# Patient Record
Sex: FEMALE | Race: WHITE | NOT HISPANIC OR LATINO | URBAN - METROPOLITAN AREA
[De-identification: names, ages, dates, MRNs, and addresses within clinical notes are randomized per-mention and may not be internally consistent; named-entity substitution may affect disease eponyms.]

---

## 2017-05-31 ENCOUNTER — OUTPATIENT (OUTPATIENT)
Dept: OUTPATIENT SERVICES | Facility: HOSPITAL | Age: 67
LOS: 1 days | End: 2017-05-31

## 2017-06-08 ENCOUNTER — OUTPATIENT (OUTPATIENT)
Dept: OUTPATIENT SERVICES | Facility: HOSPITAL | Age: 67
LOS: 1 days | End: 2017-06-08

## 2017-08-21 ENCOUNTER — OUTPATIENT (OUTPATIENT)
Dept: OUTPATIENT SERVICES | Facility: HOSPITAL | Age: 67
LOS: 1 days | End: 2017-08-21

## 2018-06-12 ENCOUNTER — OUTPATIENT (OUTPATIENT)
Dept: OUTPATIENT SERVICES | Facility: HOSPITAL | Age: 68
LOS: 1 days | End: 2018-06-12

## 2018-06-19 ENCOUNTER — OUTPATIENT (OUTPATIENT)
Dept: OUTPATIENT SERVICES | Facility: HOSPITAL | Age: 68
LOS: 1 days | End: 2018-06-19

## 2018-09-27 ENCOUNTER — OUTPATIENT (OUTPATIENT)
Dept: OUTPATIENT SERVICES | Facility: HOSPITAL | Age: 68
LOS: 1 days | End: 2018-09-27

## 2018-11-02 ENCOUNTER — OUTPATIENT (OUTPATIENT)
Dept: OUTPATIENT SERVICES | Facility: HOSPITAL | Age: 68
LOS: 1 days | End: 2018-11-02

## 2019-01-17 ENCOUNTER — OUTPATIENT (OUTPATIENT)
Dept: OUTPATIENT SERVICES | Facility: HOSPITAL | Age: 69
LOS: 1 days | End: 2019-01-17

## 2019-05-28 PROBLEM — Z00.00 ENCOUNTER FOR PREVENTIVE HEALTH EXAMINATION: Status: ACTIVE | Noted: 2019-05-28

## 2019-05-31 ENCOUNTER — NON-APPOINTMENT (OUTPATIENT)
Age: 69
End: 2019-05-31

## 2019-05-31 ENCOUNTER — APPOINTMENT (OUTPATIENT)
Dept: CARDIOLOGY | Facility: CLINIC | Age: 69
End: 2019-05-31
Payer: MEDICARE

## 2019-05-31 VITALS
HEIGHT: 71 IN | WEIGHT: 222 LBS | HEART RATE: 80 BPM | DIASTOLIC BLOOD PRESSURE: 60 MMHG | SYSTOLIC BLOOD PRESSURE: 122 MMHG | OXYGEN SATURATION: 96 % | BODY MASS INDEX: 31.08 KG/M2

## 2019-05-31 DIAGNOSIS — Z81.8 FAMILY HISTORY OF OTHER MENTAL AND BEHAVIORAL DISORDERS: ICD-10-CM

## 2019-05-31 PROCEDURE — 93000 ELECTROCARDIOGRAM COMPLETE: CPT

## 2019-05-31 PROCEDURE — 99204 OFFICE O/P NEW MOD 45 MIN: CPT | Mod: 25

## 2019-05-31 RX ORDER — MECLIZINE HYDROCHLORIDE 25 MG/1
25 TABLET ORAL 3 TIMES DAILY
Refills: 0 | Status: ACTIVE | COMMUNITY

## 2019-05-31 RX ORDER — ASCORBIC ACID
CRYSTALS ORAL
Refills: 0 | Status: ACTIVE | COMMUNITY

## 2019-05-31 RX ORDER — IBUPROFEN 200 MG
600 CAPSULE ORAL
Refills: 0 | Status: ACTIVE | COMMUNITY

## 2019-05-31 RX ORDER — OMEPRAZOLE MAGNESIUM 20 MG/1
20 CAPSULE, DELAYED RELEASE ORAL DAILY
Refills: 0 | Status: ACTIVE | COMMUNITY

## 2019-05-31 RX ORDER — DENOSUMAB 60 MG/ML
60 INJECTION SUBCUTANEOUS
Refills: 0 | Status: ACTIVE | COMMUNITY

## 2019-05-31 RX ORDER — TRAVOPROST 0.04 MG/ML
0 SOLUTION/ DROPS OPHTHALMIC
Refills: 0 | Status: ACTIVE | COMMUNITY

## 2019-05-31 NOTE — HISTORY OF PRESENT ILLNESS
[FreeTextEntry1] : This is a 69 year old female with history of HTN presented to PCP for routine examination. She was told she had an irregular heart beat and she ended up having and echocardiogram and wore a Holter Monitor. Patient states that over the last 6 months at the end of the day she feels a tightness across her chest like her bra is too tight. It doesn't occur every day. It does not radiate down her arms or up into her jaws. She does have mild dyspnea with exertion, for example, she states she gets winded when she walks up a flight of stairs.

## 2019-05-31 NOTE — DISCUSSION/SUMMARY
[FreeTextEntry1] : 1. Chest Pain/Dyspnea: will order plain treadmill stress test. Patient states she had echo done with PCP. Will attempt to obtain results. Will attempt to obtain results of Holter Monitor as well.\par \par Patient can follow up with me after testing to go over results.

## 2019-05-31 NOTE — PHYSICAL EXAM
[General Appearance - Well Developed] : well developed [Normal Appearance] : normal appearance [Well Groomed] : well groomed [General Appearance - Well Nourished] : well nourished [General Appearance - In No Acute Distress] : no acute distress [Normal Conjunctiva] : the conjunctiva exhibited no abnormalities [Eyelids - No Xanthelasma] : the eyelids demonstrated no xanthelasmas [Normal Oral Mucosa] : normal oral mucosa [No Oral Cyanosis] : no oral cyanosis [Normal Oropharynx] : normal oropharynx [FreeTextEntry1] : No JVD, No carotid artery bruits auscultated bilaterally [Heart Rate And Rhythm] : heart rate and rhythm were normal [Heart Sounds] : normal S1 and S2 [Murmurs] : no murmurs present [Edema] : no peripheral edema present [Respiration, Rhythm And Depth] : normal respiratory rhythm and effort [Exaggerated Use Of Accessory Muscles For Inspiration] : no accessory muscle use [Auscultation Breath Sounds / Voice Sounds] : lungs were clear to auscultation bilaterally [Abnormal Walk] : normal gait [Gait - Sufficient For Exercise Testing] : the gait was sufficient for exercise testing [Nail Clubbing] : no clubbing of the fingernails [Cyanosis, Localized] : no localized cyanosis [Skin Turgor] : normal skin turgor [] : no rash [Impaired Insight] : insight and judgment were intact [Affect] : the affect was normal [Memory Recent] : recent memory was not impaired

## 2019-05-31 NOTE — REVIEW OF SYSTEMS
[Shortness Of Breath] : shortness of breath [Dyspnea on exertion] : dyspnea during exertion [Chest Pain] : no chest pain [Lower Ext Edema] : no extremity edema [Joint Pain] : joint pain [Joint Stiffness] : joint stiffness [Negative] : Heme/Lymph

## 2019-06-03 ENCOUNTER — INPATIENT (INPATIENT)
Facility: HOSPITAL | Age: 69
LOS: 1 days | Discharge: HOME | End: 2019-06-05
Attending: FAMILY MEDICINE | Admitting: FAMILY MEDICINE
Payer: MEDICARE

## 2019-06-03 ENCOUNTER — OUTPATIENT (OUTPATIENT)
Dept: OUTPATIENT SERVICES | Facility: HOSPITAL | Age: 69
LOS: 1 days | End: 2019-06-03

## 2019-06-03 PROCEDURE — 73140 X-RAY EXAM OF FINGER(S): CPT | Mod: 26,RT

## 2019-06-03 PROCEDURE — 99284 EMERGENCY DEPT VISIT MOD MDM: CPT

## 2019-06-03 PROCEDURE — 73130 X-RAY EXAM OF HAND: CPT | Mod: 26,LT

## 2019-06-04 ENCOUNTER — OUTPATIENT (OUTPATIENT)
Dept: OUTPATIENT SERVICES | Facility: HOSPITAL | Age: 69
LOS: 1 days | End: 2019-06-04

## 2019-06-04 PROCEDURE — 93010 ELECTROCARDIOGRAM REPORT: CPT

## 2019-06-05 ENCOUNTER — OUTPATIENT (OUTPATIENT)
Dept: OUTPATIENT SERVICES | Facility: HOSPITAL | Age: 69
LOS: 1 days | End: 2019-06-05

## 2019-06-07 ENCOUNTER — APPOINTMENT (OUTPATIENT)
Dept: CARDIOLOGY | Facility: CLINIC | Age: 69
End: 2019-06-07

## 2019-06-11 ENCOUNTER — APPOINTMENT (OUTPATIENT)
Dept: CARDIOLOGY | Facility: CLINIC | Age: 69
End: 2019-06-11

## 2019-06-24 ENCOUNTER — APPOINTMENT (OUTPATIENT)
Dept: CT IMAGING | Facility: CLINIC | Age: 69
End: 2019-06-24
Payer: MEDICARE

## 2019-06-24 ENCOUNTER — APPOINTMENT (OUTPATIENT)
Dept: CARDIOLOGY | Facility: CLINIC | Age: 69
End: 2019-06-24
Payer: MEDICARE

## 2019-06-24 VITALS
BODY MASS INDEX: 30.52 KG/M2 | HEIGHT: 71 IN | DIASTOLIC BLOOD PRESSURE: 64 MMHG | SYSTOLIC BLOOD PRESSURE: 120 MMHG | WEIGHT: 218 LBS | OXYGEN SATURATION: 95 % | HEART RATE: 84 BPM

## 2019-06-24 PROCEDURE — 82565A: CUSTOM | Mod: QW

## 2019-06-24 PROCEDURE — 99214 OFFICE O/P EST MOD 30 MIN: CPT

## 2019-06-24 PROCEDURE — 74177 CT ABD & PELVIS W/CONTRAST: CPT

## 2019-06-24 PROCEDURE — Q9967D: CUSTOM

## 2019-06-24 NOTE — PHYSICAL EXAM
[Normal Appearance] : normal appearance [General Appearance - Well Developed] : well developed [Well Groomed] : well groomed [General Appearance - Well Nourished] : well nourished [General Appearance - In No Acute Distress] : no acute distress [Normal Oral Mucosa] : normal oral mucosa [Eyelids - No Xanthelasma] : the eyelids demonstrated no xanthelasmas [Normal Conjunctiva] : the conjunctiva exhibited no abnormalities [Normal Oropharynx] : normal oropharynx [FreeTextEntry1] : No JVD, No carotid artery bruits auscultated bilaterally [No Oral Cyanosis] : no oral cyanosis [Respiration, Rhythm And Depth] : normal respiratory rhythm and effort [Exaggerated Use Of Accessory Muscles For Inspiration] : no accessory muscle use [Heart Sounds] : normal S1 and S2 [Auscultation Breath Sounds / Voice Sounds] : lungs were clear to auscultation bilaterally [Heart Rate And Rhythm] : heart rate and rhythm were normal [Edema] : no peripheral edema present [Murmurs] : no murmurs present [Abnormal Walk] : normal gait [Nail Clubbing] : no clubbing of the fingernails [Gait - Sufficient For Exercise Testing] : the gait was sufficient for exercise testing [Cyanosis, Localized] : no localized cyanosis [Skin Turgor] : normal skin turgor [] : no rash [Affect] : the affect was normal [Impaired Insight] : insight and judgment were intact [Memory Recent] : recent memory was not impaired

## 2019-06-24 NOTE — REVIEW OF SYSTEMS
[Shortness Of Breath] : shortness of breath [Dyspnea on exertion] : dyspnea during exertion [Chest Pain] : no chest pain [Lower Ext Edema] : no extremity edema [Joint Pain] : joint pain [Joint Stiffness] : joint stiffness [Negative] : Endocrine

## 2019-06-24 NOTE — HISTORY OF PRESENT ILLNESS
[FreeTextEntry1] : Historical Perspective:\par This is a 69 year old female with history of HTN presented to PCP for routine examination. She was told she had an irregular heart beat and she ended up having and echocardiogram and wore a Holter Monitor. Patient states that over the last 6 months at the end of the day she feels a tightness across her chest like her bra is too tight. It doesn't occur every day. It does not radiate down her arms or up into her jaws. She does have mild dyspnea with exertion, for example, she states she gets winded when she walks up a flight of stairs. \par \par Current Health Status:\par Patient was hospitalized a few days in the beginning of this month for hand cellulitis after being bit by a cat. Off Abx now.

## 2019-07-30 ENCOUNTER — APPOINTMENT (OUTPATIENT)
Dept: CARDIOLOGY | Facility: CLINIC | Age: 69
End: 2019-07-30
Payer: MEDICARE

## 2019-07-30 PROCEDURE — 93015 CV STRESS TEST SUPVJ I&R: CPT

## 2019-08-08 ENCOUNTER — APPOINTMENT (OUTPATIENT)
Dept: MAMMOGRAPHY | Facility: CLINIC | Age: 69
End: 2019-08-08
Payer: MEDICARE

## 2019-08-08 PROCEDURE — 77063 BREAST TOMOSYNTHESIS BI: CPT

## 2019-08-08 PROCEDURE — 77067 SCR MAMMO BI INCL CAD: CPT

## 2019-08-22 ENCOUNTER — APPOINTMENT (OUTPATIENT)
Dept: CARDIOLOGY | Facility: CLINIC | Age: 69
End: 2019-08-22
Payer: MEDICARE

## 2019-08-22 VITALS
DIASTOLIC BLOOD PRESSURE: 68 MMHG | SYSTOLIC BLOOD PRESSURE: 142 MMHG | HEIGHT: 71 IN | BODY MASS INDEX: 30.66 KG/M2 | OXYGEN SATURATION: 97 % | HEART RATE: 74 BPM | WEIGHT: 219 LBS

## 2019-08-22 PROCEDURE — 99215 OFFICE O/P EST HI 40 MIN: CPT

## 2019-08-22 NOTE — REVIEW OF SYSTEMS
[Shortness Of Breath] : shortness of breath [Dyspnea on exertion] : dyspnea during exertion [Joint Pain] : joint pain [Joint Stiffness] : joint stiffness [Negative] : Endocrine [Chest Pain] : no chest pain [Lower Ext Edema] : no extremity edema

## 2019-08-22 NOTE — HISTORY OF PRESENT ILLNESS
[FreeTextEntry1] : Historical Perspective:\par This is a 69 year old female with history of HTN presented to PCP for routine examination. She was told she had an irregular heart beat and she ended up having and echocardiogram and wore a Holter Monitor. Patient states that over the last 6 months at the end of the day she feels a tightness across her chest like her bra is too tight. It doesn't occur every day. It does not radiate down her arms or up into her jaws. She does have mild dyspnea with exertion, for example, she states she gets winded when she walks up a flight of stairs. \par \par Current Health Status:\par Patient had stress test. Possible SVT vs. PAF on stress test. Has event monitor which found PAF on 8/21/2019. Patient asymptomatic.

## 2019-08-22 NOTE — DISCUSSION/SUMMARY
[FreeTextEntry1] : 1. Chest Pain/Dyspnea: normal LV systolic function on echocardiogram from May 2019. No ischemia noted on plain treadmill stress test. \par \par 2. Paroxysmal Atrial Fibrillation: asymptomatic. Continue Toprol XL 100mg daily (high risk medication with no signs of toxicity). [id number] of 3 (age, HTN, female). Discussed risks and benefits of starting oral anticoagulation with patient. At this time we decided the benefits outweigh the risks. Will start Eliquis 5mg BID. Discussed NSAID frequent NSAID use avoidance which could increase bleeding risk. \par \par 3. Mild Aortic Valve Insufficiency: noted on May 2019 echocardiogram. Periodic echo surveillance.\par \par 4. HTN: BP controlled. Recommend continued medical therapy.

## 2019-08-22 NOTE — PHYSICAL EXAM
[General Appearance - Well Developed] : well developed [Normal Appearance] : normal appearance [Well Groomed] : well groomed [General Appearance - Well Nourished] : well nourished [General Appearance - In No Acute Distress] : no acute distress [Normal Conjunctiva] : the conjunctiva exhibited no abnormalities [Eyelids - No Xanthelasma] : the eyelids demonstrated no xanthelasmas [Normal Oral Mucosa] : normal oral mucosa [No Oral Cyanosis] : no oral cyanosis [Normal Oropharynx] : normal oropharynx [Respiration, Rhythm And Depth] : normal respiratory rhythm and effort [Exaggerated Use Of Accessory Muscles For Inspiration] : no accessory muscle use [Auscultation Breath Sounds / Voice Sounds] : lungs were clear to auscultation bilaterally [Heart Rate And Rhythm] : heart rate and rhythm were normal [Heart Sounds] : normal S1 and S2 [Murmurs] : no murmurs present [Edema] : no peripheral edema present [Abnormal Walk] : normal gait [Gait - Sufficient For Exercise Testing] : the gait was sufficient for exercise testing [Nail Clubbing] : no clubbing of the fingernails [Cyanosis, Localized] : no localized cyanosis [Skin Turgor] : normal skin turgor [] : no rash [Impaired Insight] : insight and judgment were intact [Memory Recent] : recent memory was not impaired [Affect] : the affect was normal [FreeTextEntry1] : No JVD, No carotid artery bruits auscultated bilaterally

## 2019-09-16 ENCOUNTER — MEDICATION RENEWAL (OUTPATIENT)
Age: 69
End: 2019-09-16

## 2019-11-04 ENCOUNTER — OUTPATIENT (OUTPATIENT)
Dept: OUTPATIENT SERVICES | Facility: HOSPITAL | Age: 69
LOS: 1 days | End: 2019-11-04

## 2019-11-06 ENCOUNTER — APPOINTMENT (OUTPATIENT)
Dept: CARDIOLOGY | Facility: CLINIC | Age: 69
End: 2019-11-06
Payer: MEDICARE

## 2019-11-06 VITALS
HEIGHT: 71 IN | HEART RATE: 70 BPM | BODY MASS INDEX: 30.8 KG/M2 | WEIGHT: 220 LBS | OXYGEN SATURATION: 99 % | DIASTOLIC BLOOD PRESSURE: 76 MMHG | SYSTOLIC BLOOD PRESSURE: 130 MMHG

## 2019-11-06 PROCEDURE — 0296T: CPT

## 2019-11-06 PROCEDURE — 99215 OFFICE O/P EST HI 40 MIN: CPT

## 2019-11-06 RX ORDER — METRONIDAZOLE 7.5 MG/G
0.75 GEL TOPICAL
Qty: 45 | Refills: 0 | Status: DISCONTINUED | COMMUNITY
Start: 2019-03-11 | End: 2019-11-06

## 2019-11-06 RX ORDER — L. ACIDOPHILUS/LACTOBAC SPOR 35MM-25MM
TABLET ORAL
Qty: 100 | Refills: 0 | Status: DISCONTINUED | COMMUNITY
Start: 2019-06-05 | End: 2019-11-06

## 2019-11-06 RX ORDER — DOXYCYCLINE HYCLATE 100 MG/1
100 CAPSULE ORAL
Qty: 14 | Refills: 0 | Status: DISCONTINUED | COMMUNITY
Start: 2019-06-05 | End: 2019-11-06

## 2019-11-06 RX ORDER — AMOXICILLIN AND CLAVULANATE POTASSIUM 875; 125 MG/1; MG/1
875-125 TABLET, COATED ORAL
Qty: 20 | Refills: 0 | Status: DISCONTINUED | COMMUNITY
Start: 2019-06-11 | End: 2019-11-06

## 2019-11-06 NOTE — DISCUSSION/SUMMARY
[FreeTextEntry1] : 1. Chest Pain/Dyspnea: normal LV systolic function on echocardiogram from May 2019. No ischemia noted on plain treadmill stress test. \par \par 2. Paroxysmal Atrial Fibrillation: asymptomatic, however RVR at times. Toprol XL was increased to 125 mg daily (high risk medication with no signs of toxicity). [id number] of 3 (age, HTN, female). Continue Eliquis 5mg BID. Discussed NSAID frequent NSAID use avoidance which could increase bleeding risk. Will order 3 Day Zio Patch to assess PAF with RVR burden now that Toprol XL was increased to 125mg. If it still is occurring, I would recommend titrating up Toprol XL.\par \par 3. Mild Aortic Valve Insufficiency: noted on May 2019 echocardiogram. Periodic echo surveillance.\par \par 4. HTN: BP controlled. Recommend continued medical therapy. \par \par Follow up in 6 months.

## 2019-11-06 NOTE — PHYSICAL EXAM
[General Appearance - Well Developed] : well developed [Normal Appearance] : normal appearance [Well Groomed] : well groomed [General Appearance - Well Nourished] : well nourished [General Appearance - In No Acute Distress] : no acute distress [Normal Conjunctiva] : the conjunctiva exhibited no abnormalities [Eyelids - No Xanthelasma] : the eyelids demonstrated no xanthelasmas [Normal Oral Mucosa] : normal oral mucosa [No Oral Cyanosis] : no oral cyanosis [Normal Oropharynx] : normal oropharynx [FreeTextEntry1] : No JVD, No carotid artery bruits auscultated bilaterally [Respiration, Rhythm And Depth] : normal respiratory rhythm and effort [Exaggerated Use Of Accessory Muscles For Inspiration] : no accessory muscle use [Auscultation Breath Sounds / Voice Sounds] : lungs were clear to auscultation bilaterally [Heart Rate And Rhythm] : heart rate and rhythm were normal [Heart Sounds] : normal S1 and S2 [Murmurs] : no murmurs present [Edema] : no peripheral edema present [Abnormal Walk] : normal gait [Gait - Sufficient For Exercise Testing] : the gait was sufficient for exercise testing [Nail Clubbing] : no clubbing of the fingernails [Cyanosis, Localized] : no localized cyanosis [Skin Turgor] : normal skin turgor [] : no rash [Impaired Insight] : insight and judgment were intact [Affect] : the affect was normal [Memory Recent] : recent memory was not impaired

## 2019-11-06 NOTE — HISTORY OF PRESENT ILLNESS
[FreeTextEntry1] : Historical Perspective:\par This is a 69 year old female with history of HTN presented to PCP for routine examination. She was told she had an irregular heart beat and she ended up having and echocardiogram and wore a Holter Monitor. Patient states that over the last 6 months at the end of the day she feels a tightness across her chest like her bra is too tight. It doesn't occur every day. It does not radiate down her arms or up into her jaws. She does have mild dyspnea with exertion, for example, she states she gets winded when she walks up a flight of stairs. \par \par Current Health Status:\par Patient had stress test. Possible SVT vs. PAF on stress test. Has event monitor which found PAF on 8/21/2019. Patient asymptomatic. At times PAF with RVR so Metoprolol was increased to 125mg daily. Tolerating well with no adverse effects.

## 2019-11-07 ENCOUNTER — APPOINTMENT (OUTPATIENT)
Dept: RADIOLOGY | Facility: CLINIC | Age: 69
End: 2019-11-07
Payer: MEDICARE

## 2019-11-07 PROCEDURE — 73502 X-RAY EXAM HIP UNI 2-3 VIEWS: CPT | Mod: RT

## 2019-11-07 PROCEDURE — 72100 X-RAY EXAM L-S SPINE 2/3 VWS: CPT

## 2019-11-18 PROCEDURE — 0298T: CPT

## 2020-03-06 ENCOUNTER — OUTPATIENT (OUTPATIENT)
Dept: OUTPATIENT SERVICES | Facility: HOSPITAL | Age: 70
LOS: 1 days | End: 2020-03-06

## 2020-06-10 ENCOUNTER — NON-APPOINTMENT (OUTPATIENT)
Age: 70
End: 2020-06-10

## 2020-06-10 ENCOUNTER — APPOINTMENT (OUTPATIENT)
Dept: CARDIOLOGY | Facility: CLINIC | Age: 70
End: 2020-06-10
Payer: MEDICARE

## 2020-06-10 VITALS
WEIGHT: 220 LBS | BODY MASS INDEX: 30.8 KG/M2 | OXYGEN SATURATION: 98 % | SYSTOLIC BLOOD PRESSURE: 146 MMHG | HEIGHT: 71 IN | HEART RATE: 74 BPM | DIASTOLIC BLOOD PRESSURE: 80 MMHG

## 2020-06-10 PROCEDURE — 99215 OFFICE O/P EST HI 40 MIN: CPT | Mod: 25

## 2020-06-10 PROCEDURE — 93000 ELECTROCARDIOGRAM COMPLETE: CPT

## 2020-06-10 RX ORDER — FLUTICASONE PROPIONATE 50 UG/1
50 SPRAY, METERED NASAL
Refills: 0 | Status: DISCONTINUED | COMMUNITY
End: 2020-06-10

## 2020-06-10 RX ORDER — METOPROLOL SUCCINATE 25 MG/1
25 TABLET, EXTENDED RELEASE ORAL
Qty: 90 | Refills: 3 | Status: DISCONTINUED | COMMUNITY
Start: 1900-01-01 | End: 2020-06-10

## 2020-06-10 NOTE — PHYSICAL EXAM
[General Appearance - Well Developed] : well developed [Normal Appearance] : normal appearance [Well Groomed] : well groomed [General Appearance - In No Acute Distress] : no acute distress [General Appearance - Well Nourished] : well nourished [Normal Conjunctiva] : the conjunctiva exhibited no abnormalities [Eyelids - No Xanthelasma] : the eyelids demonstrated no xanthelasmas [Normal Oral Mucosa] : normal oral mucosa [No Oral Cyanosis] : no oral cyanosis [Normal Oropharynx] : normal oropharynx [Respiration, Rhythm And Depth] : normal respiratory rhythm and effort [Auscultation Breath Sounds / Voice Sounds] : lungs were clear to auscultation bilaterally [Exaggerated Use Of Accessory Muscles For Inspiration] : no accessory muscle use [Heart Rate And Rhythm] : heart rate and rhythm were normal [Heart Sounds] : normal S1 and S2 [Murmurs] : no murmurs present [Edema] : no peripheral edema present [Abnormal Walk] : normal gait [Gait - Sufficient For Exercise Testing] : the gait was sufficient for exercise testing [Nail Clubbing] : no clubbing of the fingernails [Cyanosis, Localized] : no localized cyanosis [] : no rash [Skin Turgor] : normal skin turgor [Impaired Insight] : insight and judgment were intact [Affect] : the affect was normal [Memory Recent] : recent memory was not impaired [FreeTextEntry1] : No JVD, No carotid artery bruits auscultated bilaterally

## 2020-06-10 NOTE — REVIEW OF SYSTEMS
[Shortness Of Breath] : shortness of breath [Dyspnea on exertion] : dyspnea during exertion [Joint Pain] : joint pain [Joint Stiffness] : joint stiffness [Negative] : Heme/Lymph [Chest Pain] : no chest pain [Lower Ext Edema] : no extremity edema

## 2020-06-10 NOTE — HISTORY OF PRESENT ILLNESS
[FreeTextEntry1] : Historical Perspective:\par This is a 69 year old female with history of HTN presented to PCP for routine examination. She was told she had an irregular heart beat and she ended up having and echocardiogram and wore a Holter Monitor. Patient states that over the last 6 months at the end of the day she feels a tightness across her chest like her bra is too tight. It doesn't occur every day. It does not radiate down her arms or up into her jaws. She does have mild dyspnea with exertion, for example, she states she gets winded when she walks up a flight of stairs. \par \par Patient had stress test in July 2019. Possible SVT vs. PAF during stress test. Has event monitor which found PAF on 8/21/2019. Patient asymptomatic. \par \par Current Health Status:\par Patient states she has been having intermittent lightheadedness. Mainly when she is working outside in the garden. She checks her BP and it is low during these times. SBP is in the low 100s. HR is in the 60-70 range. No syncope. No associated chest pain, SOB, or palpitations.

## 2020-06-10 NOTE — DISCUSSION/SUMMARY
[FreeTextEntry1] : 1. Chest Pain/Dyspnea: normal LV systolic function on echocardiogram from May 2019. No ischemia noted on plain treadmill stress test. \par \par 2. Paroxysmal Atrial Fibrillation: asymptomatic, however RVR at times. Toprol XL was increased to 125 mg daily, however patient with episodes of symptomatic blood pressure drops. Since  her PAF is short durations and asymptomatic recommend lowering dose of Toprol XL back to 100mg daily.  [id number] of 3 (age, HTN, female). Continue Eliquis 5mg BID (high risk medication with no signs of toxicity). Discussed NSAID frequent NSAID use avoidance which could increase bleeding risk. Will order echocardiogram. \par \par 3. Mild Aortic Valve Insufficiency: noted on May 2019 echocardiogram. Periodic echo surveillance.\par \par 4. HTN: BP controlled. Symptomatic drops in BP at times when she is working outside. Recommend avoiding prolonged heat exposure. Recommend adequate hydration. She is NOT taking HCTZ every day. She takes it about once a week and it is more for her vertigo/inner ear issues. \par \par Follow up in 1 month

## 2020-07-17 ENCOUNTER — APPOINTMENT (OUTPATIENT)
Dept: CARDIOLOGY | Facility: CLINIC | Age: 70
End: 2020-07-17
Payer: MEDICARE

## 2020-07-17 PROCEDURE — 93306 TTE W/DOPPLER COMPLETE: CPT

## 2020-07-29 ENCOUNTER — APPOINTMENT (OUTPATIENT)
Dept: CARDIOLOGY | Facility: CLINIC | Age: 70
End: 2020-07-29
Payer: MEDICARE

## 2020-07-29 VITALS
TEMPERATURE: 98 F | DIASTOLIC BLOOD PRESSURE: 88 MMHG | BODY MASS INDEX: 31.64 KG/M2 | HEIGHT: 71 IN | WEIGHT: 226 LBS | SYSTOLIC BLOOD PRESSURE: 174 MMHG | HEART RATE: 76 BPM | OXYGEN SATURATION: 98 %

## 2020-07-29 PROCEDURE — 99215 OFFICE O/P EST HI 40 MIN: CPT

## 2020-08-12 ENCOUNTER — APPOINTMENT (OUTPATIENT)
Dept: MAMMOGRAPHY | Facility: CLINIC | Age: 70
End: 2020-08-12
Payer: MEDICARE

## 2020-08-12 ENCOUNTER — APPOINTMENT (OUTPATIENT)
Dept: RADIOLOGY | Facility: CLINIC | Age: 70
End: 2020-08-12

## 2020-08-12 PROCEDURE — 77067 SCR MAMMO BI INCL CAD: CPT

## 2020-08-12 PROCEDURE — 77080 DXA BONE DENSITY AXIAL: CPT

## 2020-08-12 PROCEDURE — 77063 BREAST TOMOSYNTHESIS BI: CPT

## 2020-08-21 ENCOUNTER — APPOINTMENT (OUTPATIENT)
Dept: MAMMOGRAPHY | Facility: CLINIC | Age: 70
End: 2020-08-21
Payer: MEDICARE

## 2020-08-21 ENCOUNTER — APPOINTMENT (OUTPATIENT)
Dept: ULTRASOUND IMAGING | Facility: CLINIC | Age: 70
End: 2020-08-21

## 2020-08-21 PROCEDURE — G0279: CPT | Mod: RT

## 2020-08-21 PROCEDURE — 76642 ULTRASOUND BREAST LIMITED: CPT | Mod: RT

## 2020-08-21 PROCEDURE — 77065 DX MAMMO INCL CAD UNI: CPT | Mod: RT

## 2020-08-27 ENCOUNTER — APPOINTMENT (OUTPATIENT)
Dept: ULTRASOUND IMAGING | Facility: CLINIC | Age: 70
End: 2020-08-27
Payer: MEDICARE

## 2020-08-27 PROCEDURE — 76942 ECHO GUIDE FOR BIOPSY: CPT | Mod: RT

## 2020-08-27 PROCEDURE — 77065 DX MAMMO INCL CAD UNI: CPT | Mod: RT

## 2020-08-27 PROCEDURE — 19000 PUNCTURE ASPIR CYST BREAST: CPT | Mod: RT

## 2020-09-14 NOTE — REVIEW OF SYSTEMS
[Shortness Of Breath] : shortness of breath [Dyspnea on exertion] : dyspnea during exertion [Joint Pain] : joint pain [Joint Stiffness] : joint stiffness [Negative] : Heme/Lymph [Lower Ext Edema] : no extremity edema [Chest Pain] : no chest pain

## 2020-09-14 NOTE — DISCUSSION/SUMMARY
[FreeTextEntry1] : 1. Chest Pain/Dyspnea: normal LV systolic function on echocardiogram from May 2019. No ischemia noted on plain treadmill stress test. \par \par 2. Paroxysmal Atrial Fibrillation: asymptomatic, however RVR at times. Toprol XL was increased to 125 mg daily. Tolerating well with no issues.  [id number] of 3 (age, HTN, female). Continue Eliquis 5mg BID (high risk medication with no signs of toxicity). Discussed NSAID frequent NSAID use avoidance which could increase bleeding risk. Will order echocardiogram. \par \par 3. Mild Aortic Valve Insufficiency: stable on echocardiogram from July 17, 2020. Periodic echo surveillance. \par \par 4. HTN: BP elevated today, however this appears to be isolated event and patient self reports normal BP at home. Continue current medical therapy. Low sodium diet. \par \par Follow up in 6 months

## 2020-09-14 NOTE — HISTORY OF PRESENT ILLNESS
[FreeTextEntry1] : Historical Perspective:\par This is a 70 year old female with history of HTN presented to PCP for routine examination. She was told she had an irregular heart beat and she ended up having and echocardiogram and wore a Holter Monitor. Patient states that over the last 6 months at the end of the day she feels a tightness across her chest like her bra is too tight. It doesn't occur every day. It does not radiate down her arms or up into her jaws. She does have mild dyspnea with exertion, for example, she states she gets winded when she walks up a flight of stairs. \par \par Patient had stress test in July 2019. Possible SVT vs. PAF during stress test. Has event monitor which found PAF on 8/21/2019. Patient asymptomatic. \par \par Current Health Status:\par Since seeing me last patient has had no more lightheadedness episodes. I had dropped her Metoprolol from 125mg-->100mg daily. She states she felt off when that occurred and she increased it back to 125mg daily. She has been taking it at this dose and has had no issues.

## 2020-09-14 NOTE — ADDENDUM
[FreeTextEntry1] : Patient going for endoscopy. If necessary, OK to stop Eliquis 48 hours prior to procedure and resume evening of procedure.

## 2020-09-14 NOTE — PHYSICAL EXAM
[General Appearance - Well Developed] : well developed [Normal Appearance] : normal appearance [Well Groomed] : well groomed [General Appearance - Well Nourished] : well nourished [Normal Conjunctiva] : the conjunctiva exhibited no abnormalities [General Appearance - In No Acute Distress] : no acute distress [Eyelids - No Xanthelasma] : the eyelids demonstrated no xanthelasmas [No Oral Cyanosis] : no oral cyanosis [Normal Oral Mucosa] : normal oral mucosa [Normal Oropharynx] : normal oropharynx [Respiration, Rhythm And Depth] : normal respiratory rhythm and effort [Auscultation Breath Sounds / Voice Sounds] : lungs were clear to auscultation bilaterally [Exaggerated Use Of Accessory Muscles For Inspiration] : no accessory muscle use [Heart Sounds] : normal S1 and S2 [Heart Rate And Rhythm] : heart rate and rhythm were normal [Murmurs] : no murmurs present [Abnormal Walk] : normal gait [Gait - Sufficient For Exercise Testing] : the gait was sufficient for exercise testing [Edema] : no peripheral edema present [Cyanosis, Localized] : no localized cyanosis [Nail Clubbing] : no clubbing of the fingernails [Skin Turgor] : normal skin turgor [] : no rash [Affect] : the affect was normal [Impaired Insight] : insight and judgment were intact [Memory Recent] : recent memory was not impaired [FreeTextEntry1] : No JVD, No carotid artery bruits auscultated bilaterally

## 2020-09-25 ENCOUNTER — OUTPATIENT (OUTPATIENT)
Dept: OUTPATIENT SERVICES | Facility: HOSPITAL | Age: 70
LOS: 1 days | End: 2020-09-25

## 2021-01-06 ENCOUNTER — APPOINTMENT (OUTPATIENT)
Dept: CARDIOLOGY | Facility: CLINIC | Age: 71
End: 2021-01-06
Payer: MEDICARE

## 2021-01-06 ENCOUNTER — NON-APPOINTMENT (OUTPATIENT)
Age: 71
End: 2021-01-06

## 2021-01-06 VITALS
BODY MASS INDEX: 30.88 KG/M2 | TEMPERATURE: 97.7 F | SYSTOLIC BLOOD PRESSURE: 160 MMHG | HEART RATE: 84 BPM | WEIGHT: 228 LBS | DIASTOLIC BLOOD PRESSURE: 80 MMHG | HEIGHT: 72 IN | OXYGEN SATURATION: 99 %

## 2021-01-06 PROCEDURE — 93000 ELECTROCARDIOGRAM COMPLETE: CPT

## 2021-01-06 PROCEDURE — 99215 OFFICE O/P EST HI 40 MIN: CPT | Mod: 25

## 2021-01-06 RX ORDER — POTASSIUM CHLORIDE 750 MG/1
10 TABLET, FILM COATED, EXTENDED RELEASE ORAL
Refills: 0 | Status: DISCONTINUED | COMMUNITY
End: 2021-01-06

## 2021-01-06 RX ORDER — HYDROCHLOROTHIAZIDE 25 MG/1
25 TABLET ORAL DAILY
Qty: 90 | Refills: 1 | Status: DISCONTINUED | COMMUNITY
End: 2021-01-06

## 2021-01-06 NOTE — HISTORY OF PRESENT ILLNESS
[FreeTextEntry1] : Historical Perspective:\par This is a 70 year old female with history of HTN presented to PCP for routine examination. She was told she had an irregular heart beat and she ended up having and echocardiogram and wore a Holter Monitor. Patient states that over the last 6 months at the end of the day she feels a tightness across her chest like her bra is too tight. It doesn't occur every day. It does not radiate down her arms or up into her jaws. She does have mild dyspnea with exertion, for example, she states she gets winded when she walks up a flight of stairs. \par \par Patient had stress test in July 2019. Possible SVT vs. PAF during stress test. Has event monitor which found PAF on 8/21/2019. Patient asymptomatic. \par \par Current Health Status:\par Patient with no chest pain, SOB, or palpitations. No hospitalizations since seeing me last. Remains compliant with his medications and reports no adverse effects. SBP at home running in the 140s according to patient.\par

## 2021-01-06 NOTE — DISCUSSION/SUMMARY
[FreeTextEntry1] : 1. Chest Pain/Dyspnea: normal LV systolic function on echocardiogram from May 2019. No ischemia noted on plain treadmill stress test. \par \par 2. Paroxysmal Atrial Fibrillation: asymptomatic, however RVR at times. Toprol XL was increased to 125 mg daily. Tolerating well with no issues.  [id number] of 3 (age, HTN, female). Continue Eliquis 5mg BID (high risk medication with no signs of toxicity). Discussed NSAID frequent NSAID use avoidance which could increase bleeding risk. Will order echocardiogram. \par \par 3. Mild Aortic Valve Insufficiency: stable on echocardiogram from July 17, 2020. Periodic echo surveillance. \par \par 4. HTN: BP elevated and runs in the 140s systolic at home. Recommend adding Losartan 50mg daily. Goal BP is less than 130/80.  Low sodium diet. \par \par Follow up in 4 months

## 2021-01-06 NOTE — PHYSICAL EXAM
[General Appearance - Well Developed] : well developed [Normal Appearance] : normal appearance [Well Groomed] : well groomed [General Appearance - Well Nourished] : well nourished [General Appearance - In No Acute Distress] : no acute distress [Normal Conjunctiva] : the conjunctiva exhibited no abnormalities [Eyelids - No Xanthelasma] : the eyelids demonstrated no xanthelasmas [Normal Oral Mucosa] : normal oral mucosa [No Oral Cyanosis] : no oral cyanosis [Normal Oropharynx] : normal oropharynx [Respiration, Rhythm And Depth] : normal respiratory rhythm and effort [Exaggerated Use Of Accessory Muscles For Inspiration] : no accessory muscle use [Auscultation Breath Sounds / Voice Sounds] : lungs were clear to auscultation bilaterally [Heart Rate And Rhythm] : heart rate and rhythm were normal [Heart Sounds] : normal S1 and S2 [Murmurs] : no murmurs present [Edema] : no peripheral edema present [Abnormal Walk] : normal gait [Gait - Sufficient For Exercise Testing] : the gait was sufficient for exercise testing [Nail Clubbing] : no clubbing of the fingernails [Cyanosis, Localized] : no localized cyanosis [Skin Turgor] : normal skin turgor [] : no rash [Impaired Insight] : insight and judgment were intact [Affect] : the affect was normal [Memory Recent] : recent memory was not impaired [FreeTextEntry1] : No JVD, No carotid artery bruits auscultated bilaterally

## 2021-02-02 ENCOUNTER — EMERGENCY (EMERGENCY)
Facility: HOSPITAL | Age: 71
LOS: 1 days | End: 2021-02-02
Admitting: EMERGENCY MEDICINE
Payer: MEDICARE

## 2021-02-02 PROCEDURE — 93971 EXTREMITY STUDY: CPT | Mod: 26,RT

## 2021-02-02 PROCEDURE — 99284 EMERGENCY DEPT VISIT MOD MDM: CPT

## 2021-02-02 PROCEDURE — 73562 X-RAY EXAM OF KNEE 3: CPT | Mod: 26,RT

## 2021-05-07 ENCOUNTER — OUTPATIENT (OUTPATIENT)
Dept: OUTPATIENT SERVICES | Facility: HOSPITAL | Age: 71
LOS: 1 days | End: 2021-05-07

## 2021-05-12 ENCOUNTER — INPATIENT (INPATIENT)
Facility: HOSPITAL | Age: 71
LOS: 4 days | Discharge: NURSING, REHAB OR LONG-TERM CARE | End: 2021-05-17
Payer: MEDICARE

## 2021-05-12 ENCOUNTER — OUTPATIENT (OUTPATIENT)
Dept: OUTPATIENT SERVICES | Facility: HOSPITAL | Age: 71
LOS: 1 days | End: 2021-05-12

## 2021-05-12 PROCEDURE — 99285 EMERGENCY DEPT VISIT HI MDM: CPT | Mod: CS

## 2021-05-12 PROCEDURE — 93010 ELECTROCARDIOGRAM REPORT: CPT

## 2021-05-12 PROCEDURE — 70450 CT HEAD/BRAIN W/O DYE: CPT | Mod: 26

## 2021-05-13 ENCOUNTER — OUTPATIENT (OUTPATIENT)
Dept: OUTPATIENT SERVICES | Facility: HOSPITAL | Age: 71
LOS: 1 days | End: 2021-05-13

## 2021-05-14 ENCOUNTER — OUTPATIENT (OUTPATIENT)
Dept: OUTPATIENT SERVICES | Facility: HOSPITAL | Age: 71
LOS: 1 days | End: 2021-05-14

## 2021-05-15 ENCOUNTER — OUTPATIENT (OUTPATIENT)
Dept: OUTPATIENT SERVICES | Facility: HOSPITAL | Age: 71
LOS: 1 days | End: 2021-05-15

## 2021-05-15 PROCEDURE — 99232 SBSQ HOSP IP/OBS MODERATE 35: CPT

## 2021-05-16 ENCOUNTER — OUTPATIENT (OUTPATIENT)
Dept: OUTPATIENT SERVICES | Facility: HOSPITAL | Age: 71
LOS: 1 days | End: 2021-05-16

## 2021-05-16 PROCEDURE — 99233 SBSQ HOSP IP/OBS HIGH 50: CPT

## 2021-05-17 ENCOUNTER — APPOINTMENT (OUTPATIENT)
Dept: CARDIOLOGY | Facility: CLINIC | Age: 71
End: 2021-05-17

## 2021-05-17 ENCOUNTER — OUTPATIENT (OUTPATIENT)
Dept: OUTPATIENT SERVICES | Facility: HOSPITAL | Age: 71
LOS: 1 days | End: 2021-05-17

## 2021-05-19 ENCOUNTER — OUTPATIENT (OUTPATIENT)
Dept: OUTPATIENT SERVICES | Facility: HOSPITAL | Age: 71
LOS: 1 days | End: 2021-05-19

## 2021-05-27 ENCOUNTER — NON-APPOINTMENT (OUTPATIENT)
Age: 71
End: 2021-05-27

## 2021-05-27 ENCOUNTER — APPOINTMENT (OUTPATIENT)
Dept: CARDIOLOGY | Facility: CLINIC | Age: 71
End: 2021-05-27
Payer: MEDICARE

## 2021-05-27 VITALS
OXYGEN SATURATION: 99 % | HEIGHT: 72 IN | DIASTOLIC BLOOD PRESSURE: 64 MMHG | BODY MASS INDEX: 28.99 KG/M2 | SYSTOLIC BLOOD PRESSURE: 112 MMHG | WEIGHT: 214 LBS | HEART RATE: 75 BPM | TEMPERATURE: 96.9 F

## 2021-05-27 PROCEDURE — 99215 OFFICE O/P EST HI 40 MIN: CPT | Mod: 25

## 2021-05-27 PROCEDURE — 93242 EXT ECG>48HR<7D RECORDING: CPT

## 2021-05-27 PROCEDURE — 93000 ELECTROCARDIOGRAM COMPLETE: CPT | Mod: 59

## 2021-05-27 RX ORDER — LEVOCETIRIZINE DIHYDROCHLORIDE 5 MG/1
5 TABLET, FILM COATED ORAL DAILY
Refills: 0 | Status: DISCONTINUED | COMMUNITY
End: 2021-05-27

## 2021-05-27 NOTE — DISCUSSION/SUMMARY
[FreeTextEntry1] : 1. Chest Pain/Dyspnea: normal LV systolic function on echocardiogram from May 2019. No ischemia noted on plain treadmill stress test. \par \par 2. Paroxysmal Atrial Fibrillation: currently in atrial fibrillation with moderate ventricular response, asymptomatic,. On Toprol  mg daily. Recommend increasing Cardizem CD to 240mg daily (from 180mg daily) Will order Zio patch to assess adequacy of HR control. If rates continued to be elevated will consider cardioversion, however would want patient to complete ABx course for anaplasmosis first [id number] of 3 (age, HTN, female). Continue Eliquis 5mg BID (high risk medication with no signs of toxicity). Discussed NSAID frequent NSAID use avoidance which could increase bleeding risk. Will order echocardiogram. \par \par 3. Mild Aortic Valve Insufficiency: stable on echocardiogram from 5/14/2021. Periodic echo surveillance. \par \par 4. HTN: controlled. Continue current medical therapy.  Goal BP is less than 130/80. Low sodium diet. \par \par Follow up in 6 weeks.

## 2021-05-27 NOTE — REVIEW OF SYSTEMS
[Fever] : no fever [Chills] : no chills [Feeling Fatigued] : feeling fatigued [Joint Pain] : joint pain [Joint Stiffness] : joint stiffness [Easy Bleeding] : no tendency for easy bleeding [Easy Bruising] : no tendency for easy bruising [Negative] : Respiratory [de-identified] : see HPI

## 2021-05-27 NOTE — PHYSICAL EXAM
[Normal S1, S2] : normal S1, S2 [No Murmur] : no murmur [Normal] : moves all extremities, no focal deficits, normal speech [de-identified] : No JVD, no carotid artery bruits auscultated bilaterally [de-identified] : irregularly irregular [de-identified] : wheelchair [de-identified] : trace pitting edema bilateral lower extremities

## 2021-05-27 NOTE — HISTORY OF PRESENT ILLNESS
[FreeTextEntry1] : Historical Perspective:\par This is a 70 year old female with history of HTN presented to PCP for routine examination. She was told she had an irregular heart beat and she ended up having and echocardiogram and wore a Holter Monitor. Patient states that over the last 6 months at the end of the day she feels a tightness across her chest like her bra is too tight. It doesn't occur every day. It does not radiate down her arms or up into her jaws. She does have mild dyspnea with exertion, for example, she states she gets winded when she walks up a flight of stairs. \par \par Patient had stress test in July 2019. Possible SVT vs. PAF during stress test. Has event monitor which found PAF on 8/21/2019. Patient asymptomatic. 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 She developed AMS. [place] [facility] [place] also found to be in atrial fibrillation with RVR. She had cardizem added in addition to her metoprolol for better rate control. She was treated with ABx. She has 11 more days of doxycycline left. She doesn't have any palpitations, chest pain or SOB.\par

## 2021-05-27 NOTE — CARDIOLOGY SUMMARY
[de-identified] : 05/27/2021, Atrial fibrillation, HRs 107, non-specific T wave changes. [de-identified] : 5/14/2021, LV EF 55%, mild MR, mild AI, mild TR

## 2021-06-07 RX ORDER — DOXYCYCLINE HYCLATE 100 MG/1
100 CAPSULE ORAL
Refills: 0 | Status: DISCONTINUED | COMMUNITY
End: 2021-06-07

## 2021-06-15 PROCEDURE — 93244 EXT ECG>48HR<7D REV&INTERPJ: CPT

## 2021-06-18 ENCOUNTER — NON-APPOINTMENT (OUTPATIENT)
Age: 71
End: 2021-06-18

## 2021-06-23 ENCOUNTER — APPOINTMENT (OUTPATIENT)
Dept: CARDIOLOGY | Facility: CLINIC | Age: 71
End: 2021-06-23
Payer: MEDICARE

## 2021-06-23 VITALS
BODY MASS INDEX: 29.12 KG/M2 | DIASTOLIC BLOOD PRESSURE: 70 MMHG | TEMPERATURE: 97.6 F | SYSTOLIC BLOOD PRESSURE: 126 MMHG | OXYGEN SATURATION: 99 % | HEIGHT: 72 IN | HEART RATE: 107 BPM | WEIGHT: 215 LBS

## 2021-06-23 PROCEDURE — 99215 OFFICE O/P EST HI 40 MIN: CPT

## 2021-06-23 NOTE — HISTORY OF PRESENT ILLNESS
[FreeTextEntry1] : Historical Perspective:\par This is a 70 year old female with history of HTN presented to PCP for routine examination. She was told she had an irregular heart beat and she ended up having and echocardiogram and wore a Holter Monitor. Patient states that over the last 6 months at the end of the day she feels a tightness across her chest like her bra is too tight. It doesn't occur every day. It does not radiate down her arms or up into her jaws. She does have mild dyspnea with exertion, for example, she states she gets winded when she walks up a flight of stairs. \par \par Patient had stress test in July 2019. Possible SVT vs. PAF during stress test. Has event monitor which found PAF on 8/21/2019. Patient asymptomatic. 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 She developed AMS. [place] [facility] [place] was also found to be in atrial fibrillation with RVR. She had cardizem added in addition to her metoprolol for better rate control. She was treated with ABx. She has 11 more days of doxycycline left. She doesn't have any palpitations, chest pain or SOB.\par

## 2021-06-23 NOTE — DISCUSSION/SUMMARY
[FreeTextEntry1] : 1. Chest Pain/Dyspnea: normal LV systolic function on echocardiogram from May 2019. No ischemia noted on plain treadmill stress test. \par \par 2. Paroxysmal Atrial Fibrillation: currently in atrial fibrillation with moderate ventricular response. On Toprol  mg daily and Cardizem CD 240mg daily (from 180mg daily). Patient wore 4 Day Zio patch which revealed HRs average >100bpm. Best course is synchronized electrical cardioversions. [id number] of 3 (age, HTN, female). Continue Eliquis 5mg BID (high risk medication with no signs of toxicity). Discussed NSAID frequent NSAID use avoidance which could increase bleeding risk. Will order echocardiogram. \par \par 3. Mild Aortic Valve Insufficiency: stable on echocardiogram from 5/14/2021. Periodic echo surveillance. \par \par 4. HTN: controlled. Continue current medical therapy.  Goal BP is less than 130/80. Low sodium diet. \par \par

## 2021-06-23 NOTE — REVIEW OF SYSTEMS
[Fever] : no fever [Chills] : no chills [Feeling Fatigued] : feeling fatigued [Joint Pain] : joint pain [Joint Stiffness] : joint stiffness [Easy Bleeding] : no tendency for easy bleeding [Easy Bruising] : no tendency for easy bruising [Negative] : Respiratory [de-identified] : see HPI

## 2021-06-23 NOTE — CARDIOLOGY SUMMARY
[de-identified] : 05/27/2021, Atrial fibrillation, HRs 107, non-specific T wave changes. [de-identified] : 5/27/2021 4 Day Zio Patch: atrial fibrillation. Average HR >100bpm [de-identified] : 5/14/2021, LV EF 55%, mild MR, mild AI, mild TR

## 2021-06-23 NOTE — PHYSICAL EXAM
[Normal S1, S2] : normal S1, S2 [No Murmur] : no murmur [Normal] : moves all extremities, no focal deficits, normal speech [de-identified] : No JVD, no carotid artery bruits auscultated bilaterally [de-identified] : irregularly irregular [de-identified] : wheelchair [de-identified] : trace pitting edema bilateral lower extremities

## 2021-07-08 ENCOUNTER — OUTPATIENT (OUTPATIENT)
Dept: INPATIENT UNIT | Facility: HOSPITAL | Age: 71
LOS: 1 days | End: 2021-07-08
Payer: MEDICARE

## 2021-07-08 PROCEDURE — 92960 CARDIOVERSION ELECTRIC EXT: CPT

## 2021-07-15 ENCOUNTER — APPOINTMENT (OUTPATIENT)
Dept: CARDIOLOGY | Facility: CLINIC | Age: 71
End: 2021-07-15
Payer: MEDICARE

## 2021-07-15 ENCOUNTER — NON-APPOINTMENT (OUTPATIENT)
Age: 71
End: 2021-07-15

## 2021-07-15 VITALS
HEART RATE: 101 BPM | DIASTOLIC BLOOD PRESSURE: 74 MMHG | SYSTOLIC BLOOD PRESSURE: 142 MMHG | BODY MASS INDEX: 29.53 KG/M2 | HEIGHT: 72 IN | OXYGEN SATURATION: 98 % | WEIGHT: 218 LBS | TEMPERATURE: 98 F

## 2021-07-15 PROCEDURE — 93000 ELECTROCARDIOGRAM COMPLETE: CPT

## 2021-07-15 PROCEDURE — 99215 OFFICE O/P EST HI 40 MIN: CPT | Mod: 25

## 2021-07-15 RX ORDER — PROBIOTIC PRODUCT - TAB 1B-250 MG
TAB ORAL
Refills: 0 | Status: DISCONTINUED | COMMUNITY
End: 2021-07-15

## 2021-07-15 NOTE — DISCUSSION/SUMMARY
[FreeTextEntry1] : 1. Chest Pain/Dyspnea: normal LV systolic function on echocardiogram from May 2019. No ischemia noted on plain treadmill stress test. \par \par 2. Paroxysmal Atrial Fibrillation: currently in atrial fibrillation with moderate ventricular response. On Toprol  mg daily and Cardizem CD 240mg daily. Patient wore 4 Day Zio patch which revealed HRs average >100bpm. Best course is synchronized electrical cardioversions, which she failed the first time. Recommend re-attempting synchronized electrical cardioversion after Amiodarone load.  [id number] of 3 (age, HTN, female). Continue Eliquis 5mg BID (high risk medication with no signs of toxicity). Discussed NSAID frequent NSAID use avoidance which could increase bleeding risk.\par \par 3. Mild Aortic Valve Insufficiency: stable on echocardiogram from 5/14/2021. Periodic echo surveillance. \par \par 4. HTN: controlled. Continue current medical therapy.  Goal BP is less than 130/80. Low sodium diet. \par \par Follow up after cardioversion.\par \par

## 2021-07-15 NOTE — HISTORY OF PRESENT ILLNESS
[FreeTextEntry1] : Historical Perspective:\par This is a 70 year old female with history of HTN presented to PCP for routine examination. She was told she had an irregular heart beat and she ended up having and echocardiogram and wore a Holter Monitor. Patient states that over the last 6 months at the end of the day she feels a tightness across her chest like her bra is too tight. It doesn't occur every day. It does not radiate down her arms or up into her jaws. She does have mild dyspnea with exertion, for example, she states she gets winded when she walks up a flight of stairs. \par \par Patient had stress test in July 2019. Possible SVT vs. PAF during stress test. Has event monitor which found PAF on 8/21/2019. Patient asymptomatic. 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 She developed AMS. She [place] [facility] [place] She was also found to be in atrial fibrillation with RVR. She had cardizem added in addition to her metoprolol for better rate control. She was treated with ABx. \par \par Current Health Status:\par Patient failed synchronized electrical cardioversion on 7/8/2021. Remains in atrial fibrillation. She had fatigue and mild dyspnea with exertion. No associated chest pain or palpitations. There is no history of MI, CVA, CHF, or previous coronary intervention.\par

## 2021-07-15 NOTE — CARDIOLOGY SUMMARY
[de-identified] : 7/15/2021,  Atrial fibrillation, non-specific T wave changes. [de-identified] : 5/27/2021 4 Day Zio Patch: atrial fibrillation. Average HR >100bpm [de-identified] : 5/14/2021, LV EF 55%, mild MR, mild AI, mild TR

## 2021-07-15 NOTE — PHYSICAL EXAM
[Normal S1, S2] : normal S1, S2 [No Murmur] : no murmur [Normal] : moves all extremities, no focal deficits, normal speech [de-identified] : No JVD, no carotid artery bruits auscultated bilaterally [de-identified] : irregularly irregular [de-identified] : wheelchair [de-identified] : trace pitting edema bilateral lower extremities

## 2021-07-15 NOTE — REVIEW OF SYSTEMS
[Feeling Fatigued] : feeling fatigued [Joint Pain] : joint pain [Joint Stiffness] : joint stiffness [Negative] : Respiratory [Fever] : no fever [Chills] : no chills [Easy Bleeding] : no tendency for easy bleeding [Easy Bruising] : no tendency for easy bruising [de-identified] : see HPI

## 2021-08-11 ENCOUNTER — NON-APPOINTMENT (OUTPATIENT)
Age: 71
End: 2021-08-11

## 2021-08-16 ENCOUNTER — OUTPATIENT (OUTPATIENT)
Dept: OUTPATIENT SERVICES | Facility: HOSPITAL | Age: 71
LOS: 1 days | End: 2021-08-16

## 2021-08-20 ENCOUNTER — APPOINTMENT (OUTPATIENT)
Dept: CARDIOLOGY | Facility: CLINIC | Age: 71
End: 2021-08-20
Payer: MEDICARE

## 2021-08-20 VITALS
BODY MASS INDEX: 29.53 KG/M2 | DIASTOLIC BLOOD PRESSURE: 76 MMHG | TEMPERATURE: 97.7 F | HEIGHT: 72 IN | WEIGHT: 218 LBS | SYSTOLIC BLOOD PRESSURE: 156 MMHG | OXYGEN SATURATION: 98 % | HEART RATE: 70 BPM

## 2021-08-20 PROCEDURE — 99215 OFFICE O/P EST HI 40 MIN: CPT

## 2021-08-20 PROCEDURE — 93242 EXT ECG>48HR<7D RECORDING: CPT

## 2021-08-20 NOTE — REVIEW OF SYSTEMS
[Fever] : no fever [Chills] : no chills [Feeling Fatigued] : feeling fatigued [Joint Pain] : joint pain [Joint Stiffness] : joint stiffness [Easy Bleeding] : no tendency for easy bleeding [Easy Bruising] : no tendency for easy bruising [Negative] : Respiratory [de-identified] : see HPI

## 2021-08-20 NOTE — CARDIOLOGY SUMMARY
[de-identified] : 8/11/2021, NSR, low voltages. [de-identified] : 5/27/2021 4 Day Zio Patch: atrial fibrillation. Average HR >100bpm [de-identified] : 5/14/2021, LV EF 55%, mild MR, mild AI, mild TR

## 2021-08-20 NOTE — DISCUSSION/SUMMARY
[FreeTextEntry1] : 1. Chest Pain/Dyspnea: normal LV systolic function on echocardiogram from May 2019. No ischemia noted on plain treadmill stress test. \par \par 2. Paroxysmal Atrial Fibrillation: currently in NSR, however patient feeling PAF. On Toprol  mg daily, Amiodarone 200mg daily, and Cardizem CD 240mg daily. Recommend performing nuclear stress testing, repeating a 3 day live Zio monitor and refer patient to EP, Dr. Ga, for discussion of ablation. Continue Eliquis 5mg BID (high risk medication with no signs of toxicity). Discussed NSAID frequent NSAID use avoidance which could increase bleeding risk.\par \par 3. Mild Aortic Valve Insufficiency: stable on echocardiogram from 5/14/2021. Periodic echo surveillance. \par \par 4. HTN: controlled. Continue current medical therapy.  Goal BP is less than 130/80. Low sodium diet. \par \par Follow up in 3 months. \par \par

## 2021-08-20 NOTE — PHYSICAL EXAM
[Normal S1, S2] : normal S1, S2 [No Murmur] : no murmur [Normal] : moves all extremities, no focal deficits, normal speech [de-identified] : No JVD, no carotid artery bruits auscultated bilaterally [de-identified] : irregularly irregular [de-identified] : wheelchair [de-identified] : trace pitting edema bilateral lower extremities

## 2021-08-20 NOTE — HISTORY OF PRESENT ILLNESS
[FreeTextEntry1] : Historical Perspective:\par This is a 70 year old female with history of HTN presented to PCP for routine examination. She was told she had an irregular heart beat and she ended up having and echocardiogram and wore a Holter Monitor. Patient states that over the last 6 months at the end of the day she feels a tightness across her chest like her bra is too tight. It doesn't occur every day. It does not radiate down her arms or up into her jaws. She does have mild dyspnea with exertion, for example, she states she gets winded when she walks up a flight of stairs. \par \par Patient had stress test in July 2019. Possible SVT vs. PAF during stress test. Has event monitor which found PAF on 8/21/2019. Patient asymptomatic. 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 She developed AMS. [place] [facility] [place] also found to be in atrial fibrillation with RVR. She had cardizem added in addition to her metoprolol for better rate control. She was treated with ABx. \par \par Current Health Status:\par Patient failed synchronized electrical cardioversion on 7/8/2021. I placed her on Amiodarone, with the plan to repeat synchronized electrical cardioversion. Prior to her cardioversion date, patient converted to NSR. She returns today feeling breakthrough a-fib episodes. \par

## 2021-08-26 ENCOUNTER — APPOINTMENT (OUTPATIENT)
Dept: MAMMOGRAPHY | Facility: CLINIC | Age: 71
End: 2021-08-26
Payer: MEDICARE

## 2021-08-26 PROCEDURE — 77067 SCR MAMMO BI INCL CAD: CPT

## 2021-08-26 PROCEDURE — 77063 BREAST TOMOSYNTHESIS BI: CPT

## 2021-09-01 PROCEDURE — 93244 EXT ECG>48HR<7D REV&INTERPJ: CPT

## 2021-09-02 ENCOUNTER — APPOINTMENT (OUTPATIENT)
Dept: CARDIOLOGY | Facility: CLINIC | Age: 71
End: 2021-09-02
Payer: MEDICARE

## 2021-09-02 PROCEDURE — A9502: CPT

## 2021-09-02 PROCEDURE — 93015 CV STRESS TEST SUPVJ I&R: CPT

## 2021-09-02 PROCEDURE — 78452 HT MUSCLE IMAGE SPECT MULT: CPT

## 2021-09-05 ENCOUNTER — TRANSCRIPTION ENCOUNTER (OUTPATIENT)
Age: 71
End: 2021-09-05

## 2021-09-27 ENCOUNTER — NON-APPOINTMENT (OUTPATIENT)
Age: 71
End: 2021-09-27

## 2021-09-27 ENCOUNTER — APPOINTMENT (OUTPATIENT)
Dept: ELECTROPHYSIOLOGY | Facility: CLINIC | Age: 71
End: 2021-09-27
Payer: MEDICARE

## 2021-09-27 VITALS
DIASTOLIC BLOOD PRESSURE: 70 MMHG | SYSTOLIC BLOOD PRESSURE: 134 MMHG | HEART RATE: 64 BPM | HEIGHT: 72 IN | TEMPERATURE: 97.5 F | BODY MASS INDEX: 29.66 KG/M2 | WEIGHT: 219 LBS | OXYGEN SATURATION: 96 %

## 2021-09-27 PROCEDURE — 93000 ELECTROCARDIOGRAM COMPLETE: CPT

## 2021-09-27 PROCEDURE — 99204 OFFICE O/P NEW MOD 45 MIN: CPT

## 2021-09-27 RX ORDER — AMIODARONE HYDROCHLORIDE 200 MG/1
200 TABLET ORAL DAILY
Qty: 90 | Refills: 1 | Status: DISCONTINUED | COMMUNITY
Start: 2021-07-15 | End: 2021-09-27

## 2021-09-27 NOTE — PHYSICAL EXAM
[No Acute Distress] : no acute distress [Obese] : obese [Normal Conjunctiva] : normal conjunctiva [Normal S1, S2] : normal S1, S2 [No Murmur] : no murmur [No Rub] : no rub [Clear Lung Fields] : clear lung fields [No Masses/organomegaly] : no masses/organomegaly [Normal Gait] : normal gait [No Edema] : no edema [No Rash] : no rash [No Focal Deficits] : no focal deficits [Alert and Oriented] : alert and oriented

## 2021-10-01 NOTE — CARDIOLOGY SUMMARY
[de-identified] : Sinus  Bradycardia 59 bpm\par \par IL interval 180 ms QRS duration 86 ms QT interval 460 ms\par Low voltage in precordial leads. \par \par

## 2021-10-01 NOTE — HISTORY OF PRESENT ILLNESS
[FreeTextEntry1] : Patient is a 71-year-old woman who is seen in evaluation for possible catheter ablation for A. fib.\par \par She has a prior history of hypertension.  She has had previous complaints of irregular heartbeat.  She had a monitor placed August 2019 and was noted she had PAF with RVR.  She was treated with beta-blocker therapy.  Patient underwent a stress test in July 2019 and question whether she had SVT versus PAF on the stress test.\par \par The patient had developed anaplasmosis from the tick bite [place] tick.  She developed AMS.  She went to [facility] and was found to be in A. fib with RVR.  She had persistent atrial fibrillation and underwent cardioversion on 7/8/2021.  Patient was on Eliquis, Cardizem 2040 mg daily as well as Toprol- mg daily.  Patient was seen in follow-up and was noted to have recurrent atrial fibrillation.  She was started on amiodarone 200 mg/day July 15, 2021.  Another cardioversion was planned but the patient had self converted.\par \par She is now seen in evaluation because she would prefer not to continue on amiodarone long-term given the potential for side effects.  The patient  stated that she is not aware of the difference between when she is in A. fib versus but normal rhythm.  However she has felt irregular heartbeats in the past.  Prior to the tick bite the patient was feeling well.  After the tick bite she had symptoms and not clear whether it was related  A. fib with rapid rates.\par \par In terms of her other medical problems she does have a history of hypertension.  There is no prior history of diabetes.  She is a non-smoker and has alcohol very rarely socially.\par GERD/reflux for 30 yrs\par Question of remote silent Stroke by scan\par She is not aware if she has sleep apnea.  She still lives alone.  She does not describe daytime hypersomnolence.\par She has had a prior history of varicose veins and had vascular procedure.\par Echocardiogram from 7/17/2020 showed EF 65%, left atrial diameter 4.8 cm, left atrial volume index 45 cc/m².

## 2021-10-01 NOTE — DISCUSSION/SUMMARY
[FreeTextEntry1] : The patient has had prior paroxysmal atrial fibrillation and then subsequently presented with COBY paulino with RVR. She has had a tick bite and did not feel well afterwards.  She was started on amiodarone and self converted.  The patient is  concerned about continuing amiodarone over the long period time.  Her echocardiogram showed moderate  left atrial enlargement .  She has mild diastolic dysfunction and normal wall thickness.  I agree that the catheter ablation procedure may be the better option for this patient.  The AF ablation procedure including the risk, benefits, success rate, recurrence rate, and redo rates, need to be on anticoagulation and potential antiarrhythmic were all discussed.  She expressed understanding but not willing to proceed with procedure currently.  She wants to think about it and will contact us when ready.  In the meantime we will decrease her amiodarone to 100 mg/day and recommended the patient follow-up within 4 months.\par She will work on risk factor modifications including weight loss.\par \par

## 2021-10-01 NOTE — REVIEW OF SYSTEMS
[Headache] : headache [Weight Gain (___ Lbs)] : [unfilled] ~Ulb weight gain [Vertigo] : vertigo [Fever] : no fever [SOB] : no shortness of breath [Palpitations] : no palpitations [Syncope] : no syncope [Cough] : no cough [Abdominal Pain] : no abdominal pain [Dizziness] : no dizziness [Easy Bleeding] : no tendency for easy bleeding

## 2021-11-03 ENCOUNTER — APPOINTMENT (OUTPATIENT)
Dept: CARDIOLOGY | Facility: CLINIC | Age: 71
End: 2021-11-03
Payer: MEDICARE

## 2021-11-03 VITALS
BODY MASS INDEX: 30.52 KG/M2 | DIASTOLIC BLOOD PRESSURE: 62 MMHG | SYSTOLIC BLOOD PRESSURE: 132 MMHG | WEIGHT: 218 LBS | HEART RATE: 66 BPM | OXYGEN SATURATION: 97 % | HEIGHT: 71 IN

## 2021-11-03 PROCEDURE — 99215 OFFICE O/P EST HI 40 MIN: CPT

## 2021-11-03 NOTE — REVIEW OF SYSTEMS
[Fever] : no fever [Chills] : no chills [Feeling Fatigued] : feeling fatigued [Joint Pain] : joint pain [Joint Stiffness] : joint stiffness [Easy Bleeding] : no tendency for easy bleeding [Easy Bruising] : no tendency for easy bruising [Negative] : Respiratory [de-identified] : see HPI

## 2021-11-03 NOTE — CARDIOLOGY SUMMARY
[de-identified] : 8/11/2021, NSR, low voltages. [de-identified] : 5/27/2021 4 Day Zio Patch: atrial fibrillation. Average HR >100bpm [de-identified] : 9/2/2021, Pharmacologic Nuclear Stress Testing: no ischemia or evidence of prior infarction noted on SPECT images.\par  [de-identified] : 5/14/2021, LV EF 55%, mild MR, mild AI, mild TR

## 2021-11-03 NOTE — HISTORY OF PRESENT ILLNESS
[FreeTextEntry1] : Historical Perspective:\par This is a 70 year old female with history of HTN presented to PCP for routine examination. She was told she had an irregular heart beat and she ended up having and echocardiogram and wore a Holter Monitor. Patient states that over the last 6 months at the end of the day she feels a tightness across her chest like her bra is too tight. It doesn't occur every day. It does not radiate down her arms or up into her jaws. She does have mild dyspnea with exertion, for example, she states she gets winded when she walks up a flight of stairs. \par \par Patient had stress test in July 2019. Possible SVT vs. PAF during stress test. Has event monitor which found PAF on 8/21/2019. Patient asymptomatic. 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 She developed AMS. [place] [facility] [place] was also found to be in atrial fibrillation with RVR. She had cardizem added in addition to her metoprolol for better rate control. She was treated with ABx. \par \par Current Health Status:\par Patient failed synchronized electrical cardioversion on 7/8/2021. I placed her on Amiodarone, with the plan to repeat synchronized electrical cardioversion. Prior to her cardioversion date, patient converted to NSR.\par

## 2021-11-03 NOTE — DISCUSSION/SUMMARY
[FreeTextEntry1] : 1. Chest Pain/Dyspnea: normal LV systolic function on echocardiogram from May 2019. No ischemia noted on plain treadmill stress test. \par \par 2. Paroxysmal Atrial Fibrillation: currently in NSR, however patient feeling PAF. On Toprol  mg daily, Amiodarone 200mg daily, and Cardizem CD 240mg daily. No ischemia noted on pharmacologic nuclear stress testing September 2021. Saw Dr. Ga (EP) and thought to be a good catheter ablation candidate. Patient would like to proceed. Continue Eliquis 5mg BID (high risk medication with no signs of toxicity). Discussed NSAID frequent NSAID use avoidance which could increase bleeding risk.\par \par 3. Mild Aortic Valve Insufficiency: stable on echocardiogram from 7/17/20201. Periodic echo surveillance. \par \par 4. HTN: controlled. Continue current medical therapy.  Goal BP is less than 130/80. Low sodium diet. \par \par Follow up in 6 months. \par \par

## 2021-11-03 NOTE — PHYSICAL EXAM
[Normal] : moves all extremities, no focal deficits, normal speech [de-identified] : No carotid artery bruits auscultated bilaterally [de-identified] : wheelchair [de-identified] : trace pitting edema bilateral lower extremities

## 2021-12-13 ENCOUNTER — RX RENEWAL (OUTPATIENT)
Age: 71
End: 2021-12-13

## 2021-12-26 ENCOUNTER — NON-APPOINTMENT (OUTPATIENT)
Age: 71
End: 2021-12-26

## 2021-12-27 ENCOUNTER — APPOINTMENT (OUTPATIENT)
Dept: ELECTROPHYSIOLOGY | Facility: CLINIC | Age: 71
End: 2021-12-27
Payer: MEDICARE

## 2021-12-28 ENCOUNTER — NON-APPOINTMENT (OUTPATIENT)
Age: 71
End: 2021-12-28

## 2022-01-05 ENCOUNTER — APPOINTMENT (OUTPATIENT)
Dept: RADIOLOGY | Facility: CLINIC | Age: 72
End: 2022-01-05
Payer: MEDICARE

## 2022-01-05 PROCEDURE — 71046 X-RAY EXAM CHEST 2 VIEWS: CPT

## 2022-01-10 ENCOUNTER — APPOINTMENT (OUTPATIENT)
Dept: ELECTROPHYSIOLOGY | Facility: CLINIC | Age: 72
End: 2022-01-10
Payer: MEDICARE

## 2022-01-10 ENCOUNTER — NON-APPOINTMENT (OUTPATIENT)
Age: 72
End: 2022-01-10

## 2022-01-10 VITALS
DIASTOLIC BLOOD PRESSURE: 70 MMHG | HEIGHT: 71 IN | WEIGHT: 218 LBS | BODY MASS INDEX: 30.52 KG/M2 | SYSTOLIC BLOOD PRESSURE: 140 MMHG | TEMPERATURE: 97.1 F | OXYGEN SATURATION: 98 % | HEART RATE: 56 BPM

## 2022-01-10 PROCEDURE — 99214 OFFICE O/P EST MOD 30 MIN: CPT

## 2022-01-10 PROCEDURE — 93000 ELECTROCARDIOGRAM COMPLETE: CPT

## 2022-01-19 ENCOUNTER — APPOINTMENT (OUTPATIENT)
Dept: CT IMAGING | Facility: CLINIC | Age: 72
End: 2022-01-19
Payer: MEDICARE

## 2022-01-19 ENCOUNTER — NON-APPOINTMENT (OUTPATIENT)
Age: 72
End: 2022-01-19

## 2022-01-19 PROCEDURE — 71250 CT THORAX DX C-: CPT | Mod: MH

## 2022-01-20 ENCOUNTER — OUTPATIENT (OUTPATIENT)
Dept: OUTPATIENT SERVICES | Facility: HOSPITAL | Age: 72
LOS: 1 days | End: 2022-01-20
Payer: MEDICARE

## 2022-01-20 VITALS
TEMPERATURE: 97 F | OXYGEN SATURATION: 96 % | HEIGHT: 72 IN | SYSTOLIC BLOOD PRESSURE: 140 MMHG | RESPIRATION RATE: 20 BRPM | HEART RATE: 59 BPM | WEIGHT: 224.21 LBS | DIASTOLIC BLOOD PRESSURE: 70 MMHG

## 2022-01-20 DIAGNOSIS — Z29.9 ENCOUNTER FOR PROPHYLACTIC MEASURES, UNSPECIFIED: ICD-10-CM

## 2022-01-20 DIAGNOSIS — Z98.890 OTHER SPECIFIED POSTPROCEDURAL STATES: Chronic | ICD-10-CM

## 2022-01-20 DIAGNOSIS — I48.0 PAROXYSMAL ATRIAL FIBRILLATION: ICD-10-CM

## 2022-01-20 DIAGNOSIS — Z01.818 ENCOUNTER FOR OTHER PREPROCEDURAL EXAMINATION: ICD-10-CM

## 2022-01-20 LAB
ALBUMIN SERPL ELPH-MCNC: 4.3 G/DL — SIGNIFICANT CHANGE UP (ref 3.3–5.2)
ALP SERPL-CCNC: 92 U/L — SIGNIFICANT CHANGE UP (ref 40–120)
ALT FLD-CCNC: 19 U/L — SIGNIFICANT CHANGE UP
ANION GAP SERPL CALC-SCNC: 10 MMOL/L — SIGNIFICANT CHANGE UP (ref 5–17)
APTT BLD: 38.9 SEC — HIGH (ref 27.5–35.5)
AST SERPL-CCNC: 25 U/L — SIGNIFICANT CHANGE UP
BASOPHILS # BLD AUTO: 0.05 K/UL — SIGNIFICANT CHANGE UP (ref 0–0.2)
BASOPHILS NFR BLD AUTO: 0.6 % — SIGNIFICANT CHANGE UP (ref 0–2)
BILIRUB SERPL-MCNC: 0.3 MG/DL — LOW (ref 0.4–2)
BLD GP AB SCN SERPL QL: SIGNIFICANT CHANGE UP
BUN SERPL-MCNC: 17.3 MG/DL — SIGNIFICANT CHANGE UP (ref 8–20)
CALCIUM SERPL-MCNC: 10.7 MG/DL — HIGH (ref 8.6–10.2)
CHLORIDE SERPL-SCNC: 104 MMOL/L — SIGNIFICANT CHANGE UP (ref 98–107)
CO2 SERPL-SCNC: 29 MMOL/L — SIGNIFICANT CHANGE UP (ref 22–29)
CREAT SERPL-MCNC: 1.09 MG/DL — SIGNIFICANT CHANGE UP (ref 0.5–1.3)
EOSINOPHIL # BLD AUTO: 0.1 K/UL — SIGNIFICANT CHANGE UP (ref 0–0.5)
EOSINOPHIL NFR BLD AUTO: 1.2 % — SIGNIFICANT CHANGE UP (ref 0–6)
GLUCOSE SERPL-MCNC: 101 MG/DL — HIGH (ref 70–99)
HCT VFR BLD CALC: 42.2 % — SIGNIFICANT CHANGE UP (ref 34.5–45)
HGB BLD-MCNC: 13.7 G/DL — SIGNIFICANT CHANGE UP (ref 11.5–15.5)
IMM GRANULOCYTES NFR BLD AUTO: 0.5 % — SIGNIFICANT CHANGE UP (ref 0–1.5)
INR BLD: 1.42 RATIO — HIGH (ref 0.88–1.16)
LYMPHOCYTES # BLD AUTO: 1.35 K/UL — SIGNIFICANT CHANGE UP (ref 1–3.3)
LYMPHOCYTES # BLD AUTO: 16.4 % — SIGNIFICANT CHANGE UP (ref 13–44)
MAGNESIUM SERPL-MCNC: 2.1 MG/DL — SIGNIFICANT CHANGE UP (ref 1.6–2.6)
MCHC RBC-ENTMCNC: 30.5 PG — SIGNIFICANT CHANGE UP (ref 27–34)
MCHC RBC-ENTMCNC: 32.5 GM/DL — SIGNIFICANT CHANGE UP (ref 32–36)
MCV RBC AUTO: 94 FL — SIGNIFICANT CHANGE UP (ref 80–100)
MONOCYTES # BLD AUTO: 0.73 K/UL — SIGNIFICANT CHANGE UP (ref 0–0.9)
MONOCYTES NFR BLD AUTO: 8.9 % — SIGNIFICANT CHANGE UP (ref 2–14)
NEUTROPHILS # BLD AUTO: 5.94 K/UL — SIGNIFICANT CHANGE UP (ref 1.8–7.4)
NEUTROPHILS NFR BLD AUTO: 72.4 % — SIGNIFICANT CHANGE UP (ref 43–77)
PLATELET # BLD AUTO: 200 K/UL — SIGNIFICANT CHANGE UP (ref 150–400)
POTASSIUM SERPL-MCNC: 4.3 MMOL/L — SIGNIFICANT CHANGE UP (ref 3.5–5.3)
POTASSIUM SERPL-SCNC: 4.3 MMOL/L — SIGNIFICANT CHANGE UP (ref 3.5–5.3)
PROT SERPL-MCNC: 7.3 G/DL — SIGNIFICANT CHANGE UP (ref 6.6–8.7)
PROTHROM AB SERPL-ACNC: 16.2 SEC — HIGH (ref 10.6–13.6)
RBC # BLD: 4.49 M/UL — SIGNIFICANT CHANGE UP (ref 3.8–5.2)
RBC # FLD: 11.9 % — SIGNIFICANT CHANGE UP (ref 10.3–14.5)
SODIUM SERPL-SCNC: 143 MMOL/L — SIGNIFICANT CHANGE UP (ref 135–145)
WBC # BLD: 8.21 K/UL — SIGNIFICANT CHANGE UP (ref 3.8–10.5)
WBC # FLD AUTO: 8.21 K/UL — SIGNIFICANT CHANGE UP (ref 3.8–10.5)

## 2022-01-20 PROCEDURE — G0463: CPT

## 2022-01-20 PROCEDURE — 93010 ELECTROCARDIOGRAM REPORT: CPT

## 2022-01-20 PROCEDURE — 93005 ELECTROCARDIOGRAM TRACING: CPT

## 2022-01-20 NOTE — H&P PST ADULT - NSICDXPASTMEDICALHX_GEN_ALL_CORE_FT
PAST MEDICAL HISTORY:  Hyperlipidemia     Hypertension     Nonrheumatic aortic valve insufficiency     Paroxysmal atrial fibrillation      PAST MEDICAL HISTORY:  Brooks esophagus     GERD (gastroesophageal reflux disease)     Glaucoma     Hyperlipidemia     Hypertension     Nonrheumatic aortic valve insufficiency     Paroxysmal atrial fibrillation

## 2022-01-20 NOTE — H&P PST ADULT - NSICDXPASTSURGICALHX_GEN_ALL_CORE_FT
PAST SURGICAL HISTORY:  H/O Achilles tendon repair     H/O colonoscopy     H/O endoscopy      PAST SURGICAL HISTORY:  H/O Achilles tendon repair left    H/O colonoscopy     H/O endoscopy

## 2022-01-20 NOTE — H&P PST ADULT - ASSESSMENT
72 yo F PMH of HTN, HLD, nonrheumatic aortic valve insufficiency, presents with c/o arrhythmia. She was told she had an irregular heart beat and she ended up having and echocardiogram and wore a Holter Monitor. Patient states that over the last 6 months at the end of the day she feels a tightness across her chest like her bra is too tight. It doesn't occur every day. It does not radiate down her arms or up into her jaws. She does have mild dyspnea with exertion, she states she gets winded when she walks up a flight of stairs. Patient had stress test in 2019. Possible SVT vs. PAF during stress test. Has event monitor which found PAF on 2019. 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 She developed AMS and went to [facility]. She was also found to be in atrial fibrillation with RVR, cardizem added in addition to her metoprolol for better rate control. She was treated with ABx. Patient failed synchronized electrical cardioversion on 2021. She was placed on Amiodarone, with the plan to repeat synchronized electrical cardioversion. Prior to her cardioversion date, patient converted to NSR. She is on Eliquis 5 mg BID. Patient now presents in anticipation of elective radiofrequency ablation of atrial fibrillation/MAL w/Dr Ga scheduled for 2022    Plan:   Labs pending.   Pre-procedure instructions provided (verbal & written) as follows:  Ablation 2022    - Last dose Eliquis 2022 PM (NO a/c day of ablation).    - NPO after midnight prior except meds w/sips of water.    - Hold the following medications: hold amiodarone 1 week prior to procedure per Dr Ga    OPIOID RISK TOOL    ALICIA EACH BOX THAT APPLIES AND ADD TOTALS AT THE END    FAMILY HISTORY OF SUBSTANCE ABUSE                 FEMALE         MALE                                                Alcohol                             [  ]1 pt          [  ]3pts                                               Illegal Durgs                     [  ]2 pts        [  ]3pts                                               Rx Drugs                           [  ]4 pts        [  ]4 pts    PERSONAL HISTORY OF SUBSTANCE ABUSE                                                                                          Alcohol                             [  ]3 pts       [  ]3 pts                                               Illegal Drugs                     [  ]4 pts        [  ]4 pts                                               Rx Drugs                           [  ]5 pts        [  ]5 pts    AGE BETWEEN 16-45 YEARS                                      [  ]1 pt         [  ]1 pt    HISTORY OF PREADOLESCENT   SEXUAL ABUSE                                                             [  ]3 pts        [  ]0pts    PSYCHOLOGICAL DISEASE                     ADD, OCD, Bipolar, Schizophrenia        [  ]2 pts         [  ]2 pts                      Depression                                               [  ]1 pt           [  ]1 pt           SCORING TOTAL   (add numbers and type here)              ( 0 )                                     A score of 3 or lower indicated LOW risk for future opioid abuse  A score of 4 to 7 indicated moderate risk for future opioid abuse  A score of 8 or higher indicates a high risk for opioid abuse    RAMILAI VTE 2.0 SCORE [CLOT updated 2019]    AGE RELATED RISK FACTORS                                                       MOBILITY RELATED FACTORS  [ ] Age 41-60 years                                            (1 Point)                    [ ] Bed rest                                                        (1 Point)  [ x] Age: 61-74 years                                           (2 Points)                  [ ] Plaster cast                                                   (2 Points)  [ ] Age= 75 years                                              (3 Points)                    [ ] Bed bound for more than 72 hours                 (2 Points)    DISEASE RELATED RISK FACTORS                                               GENDER SPECIFIC FACTORS  [ ] Edema in the lower extremities                       (1 Point)              [ ] Pregnancy                                                     (1 Point)  [ ] Varicose veins                                               (1 Point)                     [ ] Post-partum < 6 weeks                                   (1 Point)             [x ] BMI > 25 Kg/m2                                            (1 Point)                     [ ] Hormonal therapy  or oral contraception          (1 Point)                 [ ] Sepsis (in the previous month)                        (1 Point)               [ ] History of pregnancy complications                 (1 point)  [ ] Pneumonia or serious lung disease                                               [ ] Unexplained or recurrent                     (1 Point)           (in the previous month)                               (1 Point)  [ ] Abnormal pulmonary function test                     (1 Point)                 SURGERY RELATED RISK FACTORS  [ ] Acute myocardial infarction                              (1 Point)               [ ]  Section                                             (1 Point)  [ ] Congestive heart failure (in the previous month)  (1 Point)      [ ] Minor surgery                                                  (1 Point)   [ ] Inflammatory bowel disease                             (1 Point)               [ ] Arthroscopic surgery                                        (2 Points)  [ ] Central venous access                                      (2 Points)                [ x] General surgery lasting more than 45 minutes (2 points)  [ ] Malignancy- Present or previous                   (2 Points)                [ ] Elective arthroplasty                                         (5 points)    [ ] Stroke (in the previous month)                          (5 Points)                                                                                                                                                           HEMATOLOGY RELATED FACTORS                                                 TRAUMA RELATED RISK FACTORS  [ ] Prior episodes of VTE                                     (3 Points)                [ ] Fracture of the hip, pelvis, or leg                       (5 Points)  [ ] Positive family history for VTE                         (3 Points)             [ ] Acute spinal cord injury (in the previous month)  (5 Points)  [ ] Prothrombin 20336 A                                     (3 Points)               [ ] Paralysis  (less than 1 month)                             (5 Points)  [ ] Factor V Leiden                                             (3 Points)                  [ ] Multiple Trauma within 1 month                        (5 Points)  [ ] Lupus anticoagulants                                     (3 Points)                                                           [ ] Anticardiolipin antibodies                               (3 Points)                                                       [ ] High homocysteine in the blood                      (3 Points)                                             [ ] Other congenital or acquired thrombophilia      (3 Points)                                                [ ] Heparin induced thrombocytopenia                  (3 Points)                                     Total Score [     5     ] 70 yo F PMH of HTN, HLD, mild aortic valve insufficiency, presents with c/o arrhythmia. She was told she had an irregular heart beat and she had an echo done and wore a Holter Monitor. Patient states that over the last 6 months at the end of the day she feels a tightness across her chest like her bra is too tight. It doesn't occur every day. It does not radiate down her arms or up into her jaws. She does have mild dyspnea with exertion, she states she gets winded when she walks up a flight of stairs. Patient had stress test in 2019. Possible SVT vs. PAF during stress test. Has event monitor which found PAF on 2019. 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 She developed AMS and went to [facility]. She was also found to be in atrial fibrillation with RVR, cardizem 240 mg daily added in addition to her metoprolol 125 mg daily for better rate control. She was treated with ABx. Patient failed synchronized electrical cardioversion on 2021. She was placed on Amiodarone, with the plan to repeat synchronized electrical cardioversion. Prior to her cardioversion date, patient converted to NSR. She is on Eliquis 5 mg BID. Patient now presents in anticipation of elective radiofrequency ablation of atrial fibrillation/MAL w/Dr Ga scheduled for 2022    Plan:   Labs pending.   Pre-procedure instructions provided (verbal & written) as follows:  Ablation 2022    - Last dose Eliquis 2022 PM (NO a/c day of ablation)   - NPO after midnight prior except meds w/sips of water.    - Hold the following medications: hold amiodarone 1 week prior to procedure per Dr Ga    OPIOID RISK TOOL    ALICIA EACH BOX THAT APPLIES AND ADD TOTALS AT THE END    FAMILY HISTORY OF SUBSTANCE ABUSE                 FEMALE         MALE                                                Alcohol                             [  ]1 pt          [  ]3pts                                               Illegal Durgs                     [  ]2 pts        [  ]3pts                                               Rx Drugs                           [  ]4 pts        [  ]4 pts    PERSONAL HISTORY OF SUBSTANCE ABUSE                                                                                          Alcohol                             [  ]3 pts       [  ]3 pts                                               Illegal Drugs                     [  ]4 pts        [  ]4 pts                                               Rx Drugs                           [  ]5 pts        [  ]5 pts    AGE BETWEEN 16-45 YEARS                                      [  ]1 pt         [  ]1 pt    HISTORY OF PREADOLESCENT   SEXUAL ABUSE                                                             [  ]3 pts        [  ]0pts    PSYCHOLOGICAL DISEASE                     ADD, OCD, Bipolar, Schizophrenia        [  ]2 pts         [  ]2 pts                      Depression                                               [  ]1 pt           [  ]1 pt           SCORING TOTAL   (add numbers and type here)              ( 0 )                                     A score of 3 or lower indicated LOW risk for future opioid abuse  A score of 4 to 7 indicated moderate risk for future opioid abuse  A score of 8 or higher indicates a high risk for opioid abuse    CAPRINI VTE 2.0 SCORE [CLOT updated 2019]    AGE RELATED RISK FACTORS                                                       MOBILITY RELATED FACTORS  [ ] Age 41-60 years                                            (1 Point)                    [ ] Bed rest                                                        (1 Point)  [ x] Age: 61-74 years                                           (2 Points)                  [ ] Plaster cast                                                   (2 Points)  [ ] Age= 75 years                                              (3 Points)                    [ ] Bed bound for more than 72 hours                 (2 Points)    DISEASE RELATED RISK FACTORS                                               GENDER SPECIFIC FACTORS  [ ] Edema in the lower extremities                       (1 Point)              [ ] Pregnancy                                                     (1 Point)  [ ] Varicose veins                                               (1 Point)                     [ ] Post-partum < 6 weeks                                   (1 Point)             [x ] BMI > 25 Kg/m2                                            (1 Point)                     [ ] Hormonal therapy  or oral contraception          (1 Point)                 [ ] Sepsis (in the previous month)                        (1 Point)               [ ] History of pregnancy complications                 (1 point)  [ ] Pneumonia or serious lung disease                                               [ ] Unexplained or recurrent                     (1 Point)           (in the previous month)                               (1 Point)  [ ] Abnormal pulmonary function test                     (1 Point)                 SURGERY RELATED RISK FACTORS  [ ] Acute myocardial infarction                              (1 Point)               [ ]  Section                                             (1 Point)  [ ] Congestive heart failure (in the previous month)  (1 Point)      [ ] Minor surgery                                                  (1 Point)   [ ] Inflammatory bowel disease                             (1 Point)               [ ] Arthroscopic surgery                                        (2 Points)  [ ] Central venous access                                      (2 Points)                [ x] General surgery lasting more than 45 minutes (2 points)  [ ] Malignancy- Present or previous                   (2 Points)                [ ] Elective arthroplasty                                         (5 points)    [ ] Stroke (in the previous month)                          (5 Points)                                                                                                                                                           HEMATOLOGY RELATED FACTORS                                                 TRAUMA RELATED RISK FACTORS  [ ] Prior episodes of VTE                                     (3 Points)                [ ] Fracture of the hip, pelvis, or leg                       (5 Points)  [ ] Positive family history for VTE                         (3 Points)             [ ] Acute spinal cord injury (in the previous month)  (5 Points)  [ ] Prothrombin 75623 A                                     (3 Points)               [ ] Paralysis  (less than 1 month)                             (5 Points)  [ ] Factor V Leiden                                             (3 Points)                  [ ] Multiple Trauma within 1 month                        (5 Points)  [ ] Lupus anticoagulants                                     (3 Points)                                                           [ ] Anticardiolipin antibodies                               (3 Points)                                                       [ ] High homocysteine in the blood                      (3 Points)                                             [ ] Other congenital or acquired thrombophilia      (3 Points)                                                [ ] Heparin induced thrombocytopenia                  (3 Points)                                     Total Score [     5     ]

## 2022-01-20 NOTE — H&P PST ADULT - PROBLEM SELECTOR PLAN 2
preop assessment, ablation of atrial fibrillation/MAL w/Dr Ga scheduled for 1/17/2022 preop assessment, ablation of atrial fibrillation/MAL w/Dr Ga scheduled for 1/27/2022

## 2022-01-20 NOTE — H&P PST ADULT - NSICDXFAMILYHX_GEN_ALL_CORE_FT
FAMILY HISTORY:  Mother  Still living? Unknown  FH: dementia, Age at diagnosis: Age Unknown     FAMILY HISTORY:  Father  Still living? Unknown  FH: heart disease, Age at diagnosis: Age Unknown    Mother  Still living? Unknown  FH: dementia, Age at diagnosis: Age Unknown    Sibling  Still living? Unknown  FH: leukemia, Age at diagnosis: Age Unknown

## 2022-01-20 NOTE — H&P PST ADULT - REASON FOR ADMISSION
Priorities:  1. Increase Vitamin D to 10,000 international units per day  2. See your counselor  3. Get in to see the GI doctor   "irregular heart beat" "I am having an ablation for atrial fibrillation"

## 2022-01-20 NOTE — H&P PST ADULT - HISTORY OF PRESENT ILLNESS
72 yo F PMH of HTN, HLD, nonrheumatic aortic valve insufficiency, presents with c/o arrhythmia. She was told she had an irregular heart beat and she ended up having and echocardiogram and wore a Holter Monitor. Patient states that over the last 6 months at the end of the day she feels a tightness across her chest like her bra is too tight. It doesn't occur every day. It does not radiate down her arms or up into her jaws. She does have mild dyspnea with exertion, she states she gets winded when she walks up a flight of stairs. Patient had stress test in 2019. Possible SVT vs. PAF during stress test. Has event monitor which found PAF on 2019. 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 She developed AMS and went to [facility]. She was also found to be in atrial fibrillation with RVR, cardizem added in addition to her metoprolol for better rate control. She was treated with ABx. Patient failed synchronized electrical cardioversion on 2021. She was placed on Amiodarone, with the plan to repeat synchronized electrical cardioversion. Prior to her cardioversion date, patient converted to NSR. She is on Eliquis 5 mg BID. Patient now presents in anticipation of elective radiofrequency ablation of atrial fibrillation w/Dr Ga scheduled for 2022    Cardiology Summary    EC2021, NSR, low voltages.   Remote/Ambulatory Rhythm Monitorin2021 4 Day Zio Patch: atrial fibrillation. Average HR >100bpm   Stress Test: 2021, Pharmacologic Nuclear Stress Testing: no ischemia or evidence of prior infarction noted on SPECT images.  Echo: 2021, LV EF 55%, mild MR, mild AI, mild TR  70 yo F PMH of HTN, HLD, mild aortic valve insufficiency, presents with c/o arrhythmia. She was told she had an irregular heart beat and she had an echo done and wore a Holter Monitor. Patient states that over the last 6 months at the end of the day she feels a tightness across her chest like her bra is too tight. It doesn't occur every day. It does not radiate down her arms or up into her jaws. She does have mild dyspnea with exertion, she states she gets winded when she walks up a flight of stairs. Patient had stress test in 2019. Possible SVT vs. PAF during stress test. Has event monitor which found PAF on 2019. 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 She developed AMS and went to [facility]. She was also found to be in atrial fibrillation with RVR, cardizem 240 mg daily added in addition to her metoprolol 125 mg daily for better rate control. She was treated with ABx. Patient failed synchronized electrical cardioversion on 2021. She was placed on Amiodarone, with the plan to repeat synchronized electrical cardioversion. Prior to her cardioversion date, patient converted to NSR. She is on Eliquis 5 mg BID. Patient now presents in anticipation of elective radiofrequency ablation of atrial fibrillation/MAL w/Dr Ga scheduled for 2022    Cardiology Summary:    EC2021, NSR, low voltages.   Remote/Ambulatory Rhythm Monitorin2021 4 Day Zio Patch: atrial fibrillation. Average HR >100bpm   Stress Test: 2021, Pharmacologic Nuclear Stress Testing: no ischemia or evidence of prior infarction noted on SPECT images.  Echo: 2021, LV EF 55%, mild MR, mild AI, mild TR

## 2022-01-20 NOTE — H&P PST ADULT - LAST STRESS TEST
9/2/2021, Pharmacologic Nuclear Stress Testing: no ischemia or evidence of prior infarction noted on SPECT images

## 2022-01-22 NOTE — HISTORY OF PRESENT ILLNESS
[FreeTextEntry1] : \par \par Patient is a 71-year-old woman who is seen in follow-up evaluation prior to planned AF ablation.\par \par She has been feeling well and has had occasional palpitations with A. fib. She has no other complaints currently except for a postnasal drip. She had a varicose vein procedure done on the left side.\par \par She is currently on amiodarone 100 mg/day and anticoagulated with Eliquis. Patient is also on diltiazem as well as metoprolol.\par \par She has a prior history of hypertension.  She has had previous complaints of irregular heartbeat.  She had a monitor placed August 2019 and was noted she had PAF with RVR.  She was treated with beta-blocker therapy.  Patient underwent a stress test in July 2019 and question whether she had SVT versus PAF on the stress test.\par \par The patient had developed anaplasmosis from the tick bite [place] tick.  She developed AMS.  She went to [facility] and was found to be in A. fib with RVR.  She had persistent atrial fibrillation and underwent cardioversion on 7/8/2021.  Patient was on Eliquis, Cardizem 2040 mg daily as well as Toprol- mg daily.  Patient was seen in follow-up and was noted to have recurrent atrial fibrillation.  She was started on amiodarone 200 mg/day July 15, 2021.  Another cardioversion was planned but the patient had self converted.\par \par  There is no prior history of diabetes.  She is a non-smoker and has alcohol very rarely socially.\par GERD/reflux for 30 yrs\par Question of remote silent Stroke by scan\par She is not aware if she has sleep apnea.  She still lives alone.  She does not describe daytime hypersomnolence.\par She has had a prior history of varicose veins and had vascular procedure.\par Echocardiogram from 7/17/2020 showed EF 65%, left atrial diameter 4.8 cm, left atrial volume index 45 cc/m².

## 2022-01-22 NOTE — DISCUSSION/SUMMARY
[FreeTextEntry1] : In follow-up she is doing well on amiodarone and has not had any documented recurrent A. fib or palpitations. She still wishes to proceed with the procedure because she would like to discontinue amiodarone.\par \par The procedure was reexplained. The risk profile was rediscussed and lifestyle modifications for prevention of recurrence was also discussed.   Her echocardiogram showed moderate  left atrial enlargement .  She has mild diastolic dysfunction and normal wall thickness.   The AF ablation procedure including the risk, benefits, success rate, recurrence rate, and redo rates, need to be on anticoagulation and potential antiarrhythmic were all discussed.  She expressed understanding and wants to proceed with procedure. \par \par

## 2022-01-26 ENCOUNTER — FORM ENCOUNTER (OUTPATIENT)
Age: 72
End: 2022-01-26

## 2022-01-26 PROBLEM — K22.70 BARRETT'S ESOPHAGUS WITHOUT DYSPLASIA: Chronic | Status: ACTIVE | Noted: 2022-01-20

## 2022-01-26 PROBLEM — K21.9 GASTRO-ESOPHAGEAL REFLUX DISEASE WITHOUT ESOPHAGITIS: Chronic | Status: ACTIVE | Noted: 2022-01-20

## 2022-01-26 PROBLEM — E78.5 HYPERLIPIDEMIA, UNSPECIFIED: Chronic | Status: ACTIVE | Noted: 2022-01-20

## 2022-01-26 PROBLEM — I48.0 PAROXYSMAL ATRIAL FIBRILLATION: Chronic | Status: ACTIVE | Noted: 2022-01-20

## 2022-01-26 PROBLEM — H40.9 UNSPECIFIED GLAUCOMA: Chronic | Status: ACTIVE | Noted: 2022-01-20

## 2022-01-26 PROBLEM — I10 ESSENTIAL (PRIMARY) HYPERTENSION: Chronic | Status: ACTIVE | Noted: 2022-01-20

## 2022-01-26 PROBLEM — I35.1 NONRHEUMATIC AORTIC (VALVE) INSUFFICIENCY: Chronic | Status: ACTIVE | Noted: 2022-01-20

## 2022-01-27 ENCOUNTER — TRANSCRIPTION ENCOUNTER (OUTPATIENT)
Age: 72
End: 2022-01-27

## 2022-01-27 ENCOUNTER — OUTPATIENT (OUTPATIENT)
Dept: OUTPATIENT SERVICES | Facility: HOSPITAL | Age: 72
LOS: 1 days | End: 2022-01-27
Payer: MEDICARE

## 2022-01-27 DIAGNOSIS — Z98.890 OTHER SPECIFIED POSTPROCEDURAL STATES: Chronic | ICD-10-CM

## 2022-01-27 PROCEDURE — 93656 COMPRE EP EVAL ABLTJ ATR FIB: CPT

## 2022-01-27 PROCEDURE — 93662 INTRACARDIAC ECG (ICE): CPT | Mod: 26

## 2022-01-27 RX ORDER — TRAVOPROST 0.04 MG/ML
1 SOLUTION/ DROPS OPHTHALMIC
Qty: 0 | Refills: 0 | DISCHARGE

## 2022-01-27 RX ORDER — PREGABALIN 225 MG/1
1 CAPSULE ORAL
Qty: 0 | Refills: 0 | DISCHARGE

## 2022-01-27 RX ORDER — MECLIZINE HCL 12.5 MG
0 TABLET ORAL
Qty: 0 | Refills: 0 | DISCHARGE

## 2022-01-27 RX ORDER — METOPROLOL TARTRATE 50 MG
1 TABLET ORAL
Qty: 0 | Refills: 0 | DISCHARGE

## 2022-01-27 RX ORDER — LOSARTAN POTASSIUM 100 MG/1
1 TABLET, FILM COATED ORAL
Qty: 0 | Refills: 0 | DISCHARGE

## 2022-01-27 RX ORDER — POLYETHYLENE GLYCOL 3350 17 G/17G
0 POWDER, FOR SOLUTION ORAL
Qty: 0 | Refills: 0 | DISCHARGE

## 2022-01-28 ENCOUNTER — TRANSCRIPTION ENCOUNTER (OUTPATIENT)
Age: 72
End: 2022-01-28

## 2022-01-28 PROCEDURE — 83735 ASSAY OF MAGNESIUM: CPT

## 2022-01-28 PROCEDURE — C1889: CPT

## 2022-01-28 PROCEDURE — 93662 INTRACARDIAC ECG (ICE): CPT

## 2022-01-28 PROCEDURE — 93320 DOPPLER ECHO COMPLETE: CPT

## 2022-01-28 PROCEDURE — C1893: CPT

## 2022-01-28 PROCEDURE — 36415 COLL VENOUS BLD VENIPUNCTURE: CPT

## 2022-01-28 PROCEDURE — 93005 ELECTROCARDIOGRAM TRACING: CPT

## 2022-01-28 PROCEDURE — 93312 ECHO TRANSESOPHAGEAL: CPT

## 2022-01-28 PROCEDURE — 93655 ICAR CATH ABLTJ DSCRT ARRHYT: CPT

## 2022-01-28 PROCEDURE — 80048 BASIC METABOLIC PNL TOTAL CA: CPT

## 2022-01-28 PROCEDURE — C1766: CPT

## 2022-01-28 PROCEDURE — C1731: CPT

## 2022-01-28 PROCEDURE — 85027 COMPLETE CBC AUTOMATED: CPT

## 2022-01-28 PROCEDURE — 93656 COMPRE EP EVAL ABLTJ ATR FIB: CPT

## 2022-01-28 PROCEDURE — C1759: CPT

## 2022-01-28 PROCEDURE — C1732: CPT

## 2022-01-28 PROCEDURE — 93325 DOPPLER ECHO COLOR FLOW MAPG: CPT

## 2022-01-28 PROCEDURE — C1894: CPT

## 2022-01-28 RX ORDER — METOPROLOL TARTRATE 50 MG
1 TABLET ORAL
Qty: 0 | Refills: 0 | DISCHARGE

## 2022-01-28 RX ORDER — DILTIAZEM HCL 120 MG
1 CAPSULE, EXT RELEASE 24 HR ORAL
Qty: 0 | Refills: 0 | DISCHARGE

## 2022-02-02 ENCOUNTER — APPOINTMENT (OUTPATIENT)
Dept: ELECTROPHYSIOLOGY | Facility: CLINIC | Age: 72
End: 2022-02-02
Payer: MEDICARE

## 2022-02-02 ENCOUNTER — NON-APPOINTMENT (OUTPATIENT)
Age: 72
End: 2022-02-02

## 2022-02-02 VITALS
TEMPERATURE: 97.8 F | WEIGHT: 221 LBS | BODY MASS INDEX: 30.94 KG/M2 | HEIGHT: 71 IN | SYSTOLIC BLOOD PRESSURE: 138 MMHG | HEART RATE: 73 BPM | DIASTOLIC BLOOD PRESSURE: 64 MMHG | OXYGEN SATURATION: 98 %

## 2022-02-02 PROCEDURE — 99214 OFFICE O/P EST MOD 30 MIN: CPT

## 2022-02-02 PROCEDURE — 93000 ELECTROCARDIOGRAM COMPLETE: CPT

## 2022-02-02 RX ORDER — AMIODARONE HYDROCHLORIDE 100 MG/1
100 TABLET ORAL DAILY
Qty: 90 | Refills: 0 | Status: DISCONTINUED | COMMUNITY
Start: 2021-09-27 | End: 2022-02-02

## 2022-02-02 RX ORDER — METOPROLOL SUCCINATE 25 MG/1
25 TABLET, EXTENDED RELEASE ORAL DAILY
Qty: 90 | Refills: 3 | Status: DISCONTINUED | COMMUNITY
End: 2022-02-02

## 2022-02-08 DIAGNOSIS — I48.0 PAROXYSMAL ATRIAL FIBRILLATION: ICD-10-CM

## 2022-02-17 ENCOUNTER — APPOINTMENT (OUTPATIENT)
Dept: RADIOLOGY | Facility: CLINIC | Age: 72
End: 2022-02-17
Payer: MEDICARE

## 2022-02-17 PROCEDURE — 71046 X-RAY EXAM CHEST 2 VIEWS: CPT

## 2022-03-14 ENCOUNTER — APPOINTMENT (OUTPATIENT)
Dept: ULTRASOUND IMAGING | Facility: CLINIC | Age: 72
End: 2022-03-14
Payer: MEDICARE

## 2022-03-14 PROCEDURE — 76770 US EXAM ABDO BACK WALL COMP: CPT

## 2022-05-17 ENCOUNTER — APPOINTMENT (OUTPATIENT)
Dept: CARDIOLOGY | Facility: CLINIC | Age: 72
End: 2022-05-17
Payer: MEDICARE

## 2022-05-17 PROCEDURE — 93306 TTE W/DOPPLER COMPLETE: CPT

## 2022-05-23 ENCOUNTER — NON-APPOINTMENT (OUTPATIENT)
Age: 72
End: 2022-05-23

## 2022-05-23 ENCOUNTER — APPOINTMENT (OUTPATIENT)
Dept: ELECTROPHYSIOLOGY | Facility: CLINIC | Age: 72
End: 2022-05-23
Payer: MEDICARE

## 2022-05-23 VITALS
TEMPERATURE: 97.3 F | WEIGHT: 211 LBS | HEIGHT: 71 IN | HEART RATE: 71 BPM | BODY MASS INDEX: 29.54 KG/M2 | DIASTOLIC BLOOD PRESSURE: 74 MMHG | SYSTOLIC BLOOD PRESSURE: 142 MMHG | OXYGEN SATURATION: 96 %

## 2022-05-23 PROCEDURE — 93000 ELECTROCARDIOGRAM COMPLETE: CPT

## 2022-05-23 PROCEDURE — 99214 OFFICE O/P EST MOD 30 MIN: CPT

## 2022-05-23 RX ORDER — DILTIAZEM HYDROCHLORIDE 240 MG/1
240 CAPSULE, EXTENDED RELEASE ORAL DAILY
Qty: 90 | Refills: 3 | Status: DISCONTINUED | COMMUNITY
End: 2022-05-23

## 2022-05-23 NOTE — DISCUSSION/SUMMARY
[FreeTextEntry1] : Overall the patient is doing extremely well and her EKG today shows sinus rhythm.  She has not had any recurrence of atrial fibrillation.  She claims she is feeling much better as compared to prior to the procedure.\par \par She is on multiple medications for her blood pressure including losartan, metoprolol as well as diltiazem.  Her blood pressure seems to be controlled.  We may simplify her regimen in the future.  She will follow-up with her cardiologist/PCP.\par \par She has lost weight.\par \par Recommendation is to continue her on Eliquis given the fact that she has an elevated  [id number] score\par greater than 2.\par \par We will do follow-up monitor in the future to look for asymptomatic AF.\par \par The patient should see me in follow-up in approximately a year.  She should follow-up with her cardiologist/PCP.

## 2022-05-23 NOTE — HISTORY OF PRESENT ILLNESS
[FreeTextEntry1] : \par \par Patient is a 72-year-old woman who underwent catheter ablation for atrial fibrillation January 27, 2022 at [facility].  The procedure was uncomplicated.  In follow-up she is feeling well without any complaints.  She denies any palpitations.  She is feeling better with less fatigue.  She denies palpitations, chest pain, shortness of breath, dizziness, lightheadedness, fever, cough, abdominal pain, urinary issues, visual symptoms, headaches or groin issues.\par \par She is currently on Eliquis, metoprolol, diltiazem losartan and her GI prophylaxis medications post ablation.\par \par She has a prior history of hypertension.  She has had previous complaints of irregular heartbeat.  She had a monitor placed August 2019 and was noted she had PAF with RVR.  She was treated with beta-blocker therapy.  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  She developed AMS.  She went to [facility] and was found to be in A. fib with RVR.  She had persistent atrial fibrillation and underwent cardioversion on 7/8/2021.  Patient was on Eliquis, Cardizem 2040 mg daily as well as Toprol- mg daily.  Patient was seen in follow-up and was noted to have recurrent atrial fibrillation.  She was started on amiodarone 200 mg/day July 15, 2021.  Another cardioversion was planned but the patient had self converted.\par \par  There is no prior history of diabetes.  She is a non-smoker and has alcohol very rarely socially.\par GERD/reflux for 30 yrs\par Question of remote silent Stroke by scan\par She is not aware if she has sleep apnea.  She still lives alone.  She does not describe daytime hypersomnolence.\par She has had a prior history of varicose veins and had vascular procedure.\par Echocardiogram from 7/17/2020 showed EF 65%, left atrial diameter 4.8 cm, left atrial volume index 45 cc/m².

## 2022-05-23 NOTE — HISTORY OF PRESENT ILLNESS
[FreeTextEntry1] : \par \par Patient is a 72-year-old woman who is seen in follow-up evaluation status post catheter ablation for A. fib January 27, 2022  at [facility].  The procedure was uncomplicated.\par \par Follow-up: No evidence of recurrent A. fib.  She does get occasional dizziness.  She works in the garden and may feel little bit extra fatigue afterwards.  She denies any palpitations.  She is feeling better with less fatigue.  She denies palpitations, chest pain, shortness of breath, dizziness, lightheadedness, fever, cough, abdominal pain, urinary issues, visual symptoms, headaches or groin issues.\par \par She is currently on Eliquis, metoprolol, diltiazem losartan and her GI prophylaxis medications post ablation.\par \par  [id number] score : age ( 2), no diabetes)HTN ( 1) okay what 26\par \par She has a prior history of hypertension.  She has had previous complaints of irregular heartbeat.  She had a monitor placed August 2019 and was noted she had PAF with RVR.  She was treated with beta-blocker therapy.  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  She developed AMS.  She went to [facility] and was found to be in A. fib with RVR.  She had persistent atrial fibrillation and underwent cardioversion on 7/8/2021.  \par \par  There is no prior history of diabetes.  She is a non-smoker and has alcohol very rarely socially.\par GERD/reflux for 30 yrs\par Question of remote silent Stroke by scan\par She is not aware if she has sleep apnea.  She still lives alone.  She does not describe daytime hypersomnolence.\par She has had a prior history of varicose veins and had vascular procedure.\par Echocardiogram from 7/17/2020 showed EF 65%, left atrial diameter 4.8 cm, left atrial volume index 45 cc/m².

## 2022-05-23 NOTE — PHYSICAL EXAM
[No Acute Distress] : no acute distress [Obese] : obese [Normal Conjunctiva] : normal conjunctiva [Normal S1, S2] : normal S1, S2 [No Murmur] : no murmur [No Rub] : no rub [Clear Lung Fields] : clear lung fields [No Masses/organomegaly] : no masses/organomegaly [Normal Gait] : normal gait [No Edema] : no edema [No Rash] : no rash [No Focal Deficits] : no focal deficits [Alert and Oriented] : alert and oriented [de-identified] : Sinus rhythm

## 2022-05-23 NOTE — PHYSICAL EXAM
[No Acute Distress] : no acute distress [Obese] : obese [Normal Conjunctiva] : normal conjunctiva [Normal S1, S2] : normal S1, S2 [No Murmur] : no murmur [No Rub] : no rub [Clear Lung Fields] : clear lung fields [No Masses/organomegaly] : no masses/organomegaly [Normal Gait] : normal gait [No Edema] : no edema [No Rash] : no rash [No Focal Deficits] : no focal deficits [Alert and Oriented] : alert and oriented [de-identified] : Sinus rhythm

## 2022-05-23 NOTE — REVIEW OF SYSTEMS
[Fever] : no fever [Headache] : no headache [Weight Gain (___ Lbs)] : [unfilled] ~Ulb weight gain [Vertigo] : no vertigo [SOB] : no shortness of breath [Palpitations] : no palpitations [Syncope] : no syncope [Cough] : no cough [Abdominal Pain] : no abdominal pain [Dizziness] : no dizziness [Easy Bleeding] : no tendency for easy bleeding

## 2022-05-23 NOTE — REVIEW OF SYSTEMS
[Weight Gain (___ Lbs)] : [unfilled] ~Ulb weight gain [Fever] : no fever [Headache] : no headache [Vertigo] : no vertigo [SOB] : no shortness of breath [Palpitations] : no palpitations [Syncope] : no syncope [Cough] : no cough [Abdominal Pain] : no abdominal pain [Dizziness] : no dizziness [Easy Bleeding] : no tendency for easy bleeding

## 2022-05-23 NOTE — DISCUSSION/SUMMARY
[FreeTextEntry1] : Overall the patient is doing extremely well and her EKG today shows sinus rhythm.  She has not had any recurrence of atrial fibrillation.  She claims she is feeling much better as compared to prior to the procedure.\par \par She is on multiple medications for her blood pressure including losartan, metoprolol as well as diltiazem.  Her blood pressure seems to be controlled.  We may simplify her regimen in the future.  She will follow-up with her cardiologist/PCP.\par \par She has lost weight.\par \par Recommendation is to continue her on Eliquis given the fact that she has an elevated  [id number] score\par greater than 2.\par \par We will do follow-up monitor in the future to look for asymptomatic AF.\par \par The patient should see me in follow-up in approximately a year.  She should follow-up with her cardiologist/PCP.

## 2022-05-23 NOTE — PHYSICAL EXAM
[No Acute Distress] : no acute distress [Obese] : obese [Normal Conjunctiva] : normal conjunctiva [Normal S1, S2] : normal S1, S2 [No Murmur] : no murmur [No Rub] : no rub [Clear Lung Fields] : clear lung fields [No Masses/organomegaly] : no masses/organomegaly [Normal Gait] : normal gait [No Edema] : no edema [No Rash] : no rash [No Focal Deficits] : no focal deficits [Alert and Oriented] : alert and oriented [de-identified] : Sinus rhythm

## 2022-05-23 NOTE — DISCUSSION/SUMMARY
[FreeTextEntry1] : She is status post AF ablation and follow-up as had no evidence of complication.  Her examination today was unremarkable.  Overall she is feeling well.\par \par We will continue on current medications and obtain follow-up monitoring.\par \par Recommend that she continue on Eliquis and will be seen in follow-up in 3 months.\par \par \par

## 2022-05-23 NOTE — HISTORY OF PRESENT ILLNESS
[FreeTextEntry1] : \par \par Patient is a 72-year-old woman who is seen in follow-up evaluation status post catheter ablation for A. fib January 27, 2022  at [facility].  The procedure was uncomplicated.\par \par Follow-up: No evidence of recurrent A. fib.  She does get occasional dizziness.  She works in the garden and may feel little bit extra fatigue afterwards.  She denies any palpitations.  She is feeling better with less fatigue.  She denies palpitations, chest pain, shortness of breath, dizziness, lightheadedness, fever, cough, abdominal pain, urinary issues, visual symptoms, headaches or groin issues.\par \par She is currently on Eliquis, metoprolol, diltiazem losartan and her GI prophylaxis medications post ablation.\par \par  [id number] score : age ( 2), no diabetes)HTN ( 1) okay what 26\par \par She has a prior history of hypertension.  She has had previous complaints of irregular heartbeat.  She had a monitor placed August 2019 and was noted she had PAF with RVR.  She was treated with beta-blocker therapy.  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  She developed AMS.  She went to [facility] and was found to be in A. fib with RVR.  She had persistent atrial fibrillation and underwent cardioversion on 7/8/2021.  \par \par  There is no prior history of diabetes.  She is a non-smoker and has alcohol very rarely socially.\par GERD/reflux for 30 yrs\par Question of remote silent Stroke by scan\par She is not aware if she has sleep apnea.  She still lives alone.  She does not describe daytime hypersomnolence.\par She has had a prior history of varicose veins and had vascular procedure.\par Echocardiogram from 7/17/2020 showed EF 65%, left atrial diameter 4.8 cm, left atrial volume index 45 cc/m².

## 2022-07-13 ENCOUNTER — RX RENEWAL (OUTPATIENT)
Age: 72
End: 2022-07-13

## 2022-08-10 ENCOUNTER — NON-APPOINTMENT (OUTPATIENT)
Age: 72
End: 2022-08-10

## 2022-08-30 ENCOUNTER — APPOINTMENT (OUTPATIENT)
Dept: MAMMOGRAPHY | Facility: CLINIC | Age: 72
End: 2022-08-30

## 2022-08-30 PROCEDURE — 77067 SCR MAMMO BI INCL CAD: CPT

## 2022-08-30 PROCEDURE — 77063 BREAST TOMOSYNTHESIS BI: CPT

## 2022-09-07 ENCOUNTER — APPOINTMENT (OUTPATIENT)
Dept: CARDIOLOGY | Facility: CLINIC | Age: 72
End: 2022-09-07

## 2022-09-07 VITALS
SYSTOLIC BLOOD PRESSURE: 146 MMHG | BODY MASS INDEX: 30.1 KG/M2 | TEMPERATURE: 98.2 F | WEIGHT: 215 LBS | HEIGHT: 71 IN | OXYGEN SATURATION: 98 % | DIASTOLIC BLOOD PRESSURE: 68 MMHG | HEART RATE: 72 BPM

## 2022-09-07 VITALS — SYSTOLIC BLOOD PRESSURE: 144 MMHG | DIASTOLIC BLOOD PRESSURE: 68 MMHG

## 2022-09-07 DIAGNOSIS — Z91.018 ALLERGY TO OTHER FOODS: ICD-10-CM

## 2022-09-07 DIAGNOSIS — Z79.899 OTHER LONG TERM (CURRENT) DRUG THERAPY: ICD-10-CM

## 2022-09-07 DIAGNOSIS — Z82.49 FAMILY HISTORY OF ISCHEMIC HEART DISEASE AND OTHER DISEASES OF THE CIRCULATORY SYSTEM: ICD-10-CM

## 2022-09-07 PROCEDURE — 99214 OFFICE O/P EST MOD 30 MIN: CPT

## 2022-09-07 RX ORDER — MELATONIN 3 MG
TABLET ORAL
Refills: 0 | Status: ACTIVE | COMMUNITY

## 2022-09-15 ENCOUNTER — APPOINTMENT (OUTPATIENT)
Dept: CARDIOLOGY | Facility: CLINIC | Age: 72
End: 2022-09-15

## 2022-09-15 PROCEDURE — 93880 EXTRACRANIAL BILAT STUDY: CPT

## 2022-09-24 NOTE — ADDENDUM
[FreeTextEntry1] : \par Hx\par history of HTN presented to PCP for routine examination. She was told she had an irregular heart beat \par had stress test in 2019. Possible SVT vs. PAF during stress test. \par Has event monitor which found PAF on 2019. \par Patient developed anaplasmosis from tick. She developed AMS. She went to [facility]. She was also found to be in atrial fibrillation with RVR. She had cardizem added in addition to her metoprolol for better rate control. She was treated with ABx. \par failed synchronized electrical cardioversion on 2021. \par started Amiodarone, with the plan to repeat synchronized electrical cardioversion. \par Prior to her cardioversion date, patient converted to NSR.\par status post catheter ablation for A. fib 2022 at [facility]. The procedure was uncomplicated.\par No evidence of recurrent A. fib. \par Question of remote silent Stroke by scan\par Echocardiogram from 2020 showed EF 65%, left atrial diameter 4.8 cm, left atrial volume index 45 cc/m². \par EC2021, NSR, low voltages. \par Remote/Ambulatory Rhythm Monitorin2021 4 Day Zio Patch: atrial fibrillation. Average HR >100bpm \par Stress Test: 2021, Pharmacologic Nuclear Stress Testing: no ischemia or evidence of prior infarction noted on SPECT images.\par Echo: 2021, LV EF 55%, mild MR, mild AI, mild TR \par normal LV systolic function on echocardiogram from May 2019. No ischemia  \par Paroxysmal Atrial Fibrillation: currently in NSR, Continue Eliquis 5mg BID \par Mild Aortic Valve Insufficiency: stable on echocardiogram \par HTN: controlled. Continue current medical therapy. Goal BP is less than 130/80. Low sodium diet. \par Recommendation is to continue her on Eliquis given the fact that she has an elevated [id number] scoregreater than 2.\par We will do follow-up monitor in the future to look for asymptomatic AF.\par

## 2022-09-24 NOTE — ASSESSMENT
[FreeTextEntry1] : Follow-up monitoring has been requested to rule out atrial fibrillation.  \par Follow-up carotid Doppler.  \par Continue antihypertensive therapy.  Low-sodium diet.  \par Continue statin therapy.  \par Clinical follow-up will be scheduled after carotid Doppler and monitor.  \par   \par  \par Preoperative status [EGD] \par 09/07/2022 \par At present, there are no active cardiac conditions. \par No recent unstable coronary syndromes, decompensated heart failure, severe valvular heart disease or significant dysrhythmias.  \par Baseline functional status is acceptable.    \par The clinical benefit of the proposed procedure outweighs the associated cardiovascular risk.  \par Risk not attenuated with further CV testing.  \par Prior testing as outlined above.\par Optimized from a cardiovascular perspective.\par \par For surgery and invasive procedures, recommend to discontinue apixaban at least 48 hours prior to elective surgery or invasive procedures with a moderate-to-high risk of unacceptable or clinically significant bleeding.  Anticoagulation bridging during the 48 hours apixaban is interrupted and prior to the intervention is not generally required. Reinitiate apixaban when adequate hemostasis has been achieved.  If oral therapy cannot be administered, then consider administration of a parenteral anticoagulant. Note excess discontinuation of any oral anticoagulant, including apixaban, increases the risk of thrombotic events.

## 2022-09-24 NOTE — HISTORY OF PRESENT ILLNESS
[FreeTextEntry1] : KEV GARSIA  is a 72 year old  F\par History of hypertension hyperlipidemia.\par Prior catheter ablation of atrial fibrillation.  There is no further atrial fibrillation.  Maintained on anticoagulation.  \par There is a reported history of mild stroke.\par She remains off amiodarone. \par There is no prior history of a clinical myocardial infarction, coronary revascularization. \par There is no history of symptomatic congestive heart failure \par \par She has upcoming endoscopy.  \par There is minor bruising on Eliquis. \par She does report feeling better after her atrial fibrillation procedure.\par She has difficulty with the heat.\par She does have symptoms of dizziness.\par \par There is no exertional chest pain, pressure or discomfort. \par There is no significant dyspnea on exertion or orthopnea. \par There are no symptomatic palpitations or syncope.\par \par Last blood work January 2022 hemoglobin 13.2 creatinine 1.1 prior cholesterol 161 LDL 67 \par stress test 2021 no ischemia \par last EKG demonstrates sinus rhythm \par last echocardiogram has normal left ventricular function mild mitral regurgitation mild aortic regurgitation left atrial enlargement \par prior MAL normal left ventricular function no clot patent foramen ovale \par ablation procedure in January.\par \par

## 2022-10-03 ENCOUNTER — NON-APPOINTMENT (OUTPATIENT)
Age: 72
End: 2022-10-03

## 2022-10-07 ENCOUNTER — APPOINTMENT (OUTPATIENT)
Dept: CARDIOLOGY | Facility: CLINIC | Age: 72
End: 2022-10-07

## 2022-10-07 ENCOUNTER — NON-APPOINTMENT (OUTPATIENT)
Age: 72
End: 2022-10-07

## 2022-10-07 VITALS
WEIGHT: 221 LBS | HEART RATE: 68 BPM | DIASTOLIC BLOOD PRESSURE: 70 MMHG | BODY MASS INDEX: 30.94 KG/M2 | TEMPERATURE: 97.3 F | HEIGHT: 71 IN | OXYGEN SATURATION: 97 % | SYSTOLIC BLOOD PRESSURE: 148 MMHG

## 2022-10-07 DIAGNOSIS — Z87.898 PERSONAL HISTORY OF OTHER SPECIFIED CONDITIONS: ICD-10-CM

## 2022-10-07 DIAGNOSIS — R42 DIZZINESS AND GIDDINESS: ICD-10-CM

## 2022-10-07 PROCEDURE — 93000 ELECTROCARDIOGRAM COMPLETE: CPT

## 2022-10-07 PROCEDURE — 99214 OFFICE O/P EST MOD 30 MIN: CPT

## 2022-10-07 NOTE — ASSESSMENT
[FreeTextEntry1] : KEV GARSIA is a 72 year old F who presents today Oct 07, 2022 with the above history and the following active issues: \par \par PAF, ablation Jan 2022 [facility]\par Brief recurrent PAF thus far on monitor, +sx of CP during episode, no recurrence NSR on EKG today\par Advised pt to cont eliquis 5mg BID. \par Cont dilt 180mg in AM. Increase toprol to 150mg in PM. \par Cont biotel event monitor for another two weeks and EP followup has been arranged thereafter. \par \par HTN\par BP elevated, BB was increase. She will cont losartan 100mg daily. \par Renal function and electrolytes are stable per recent labs. \par Low-sodium diet.  \par \par HLD\par mild to mod nonobx carotid atherosclerosis \par Continue statin therapy.  \par Clinical follow-up will be scheduled after carotid Doppler and monitor.  \par   \par If recurrent CP or new sx advised to call right away for sooner eval. Monitor BP/HR with above changes. \par Any questions and concerns were addressed and resolved. \par \par Sincerely,\par \par GAEL Muir\par Patients history, testing, and plan reviewed with supervising MD: Dr. Landry Martinez

## 2022-10-07 NOTE — ADDENDUM
[FreeTextEntry1] : Please note the patient was reviewed with NP Paige Ruelas.\par I was physically present during the service of the patient.\par I was directly involved in the management plan and recommendations of the care provided to the patient. \par I personally reviewed the history and physical examination as documented by the NP above.\par \par

## 2022-10-07 NOTE — HISTORY OF PRESENT ILLNESS
[FreeTextEntry1] : KEV GARSIA  is a 72 year old  F\par History of hypertension hyperlipidemia.\par Prior catheter ablation of atrial fibrillation Jan 2022 [facility].  There is no further atrial fibrillation.  Maintained on anticoagulation.  \par There is a reported history of mild stroke.\par She remains off amiodarone. \par \par There is no prior history of a clinical myocardial infarction, coronary revascularization. \par There is no history of symptomatic congestive heart failure \par \par There is minor bruising on Eliquis. \par She does report feeling better after her atrial fibrillation procedure.\par She has difficulty with the heat.\par She does have symptoms of dizziness.\par \par There is no exertional chest pain, pressure or discomfort. \par There is no significant dyspnea on exertion or orthopnea. \par There are no symptomatic palpitations or syncope.\par \par Bio tel event monitor placed last visit. Urgent notified came through 10/2 that pt has recurrent AF/AFL on monitor. She was sleeping at the time had felt a sharp pain lasting about a min midsternal, thought she was sleeping wrong so turned then sx resolved. She went about her day gardening and felt remarkably well otherwise. \par The rhythm strip does appear to be AF/AFL with conversion pause and PVCs. Monitor is still in place. \par EKG 10/7/22 normal sinus rhythm \par \par 7/26/22 hgb 13.7, k 4.2, cr 1, LDL 82\par January 2022 hemoglobin 13.2 creatinine 1.1 prior cholesterol 161 LDL 67 \par carotid 36638 mild to mod nonbx atherosclerosis BL\par stress test 2021 no ischemia \par last echocardiogram 5/2022 has normal left ventricular function mild mitral regurgitation mild aortic regurgitation left atrial enlargement \par prior MAL normal left ventricular function no clot patent foramen ovale \par ablation procedure in January.\par \par

## 2022-11-14 ENCOUNTER — APPOINTMENT (OUTPATIENT)
Dept: ELECTROPHYSIOLOGY | Facility: CLINIC | Age: 72
End: 2022-11-14

## 2022-11-14 VITALS
HEART RATE: 62 BPM | OXYGEN SATURATION: 98 % | SYSTOLIC BLOOD PRESSURE: 142 MMHG | BODY MASS INDEX: 30.8 KG/M2 | TEMPERATURE: 96.4 F | WEIGHT: 220 LBS | HEIGHT: 71 IN | DIASTOLIC BLOOD PRESSURE: 72 MMHG

## 2022-11-14 PROCEDURE — 93000 ELECTROCARDIOGRAM COMPLETE: CPT

## 2022-11-14 PROCEDURE — 99214 OFFICE O/P EST MOD 30 MIN: CPT

## 2022-11-21 ENCOUNTER — NON-APPOINTMENT (OUTPATIENT)
Age: 72
End: 2022-11-21

## 2022-11-21 NOTE — PHYSICAL EXAM
[No Acute Distress] : no acute distress [Obese] : obese [Normal Conjunctiva] : normal conjunctiva [Normal S1, S2] : normal S1, S2 [No Murmur] : no murmur [No Rub] : no rub [Clear Lung Fields] : clear lung fields [No Masses/organomegaly] : no masses/organomegaly [Normal Gait] : normal gait [No Edema] : no edema [No Rash] : no rash [No Focal Deficits] : no focal deficits [Alert and Oriented] : alert and oriented [de-identified] : Sinus rhythm

## 2022-11-21 NOTE — DISCUSSION/SUMMARY
[FreeTextEntry1] : Patient is doing extremely well has not had any recurrence of atrial fibrillation.\par \par Her follow-up monitoring for 9/2022 did not show A. fib.  It showed a short run of SVT/atrial tach at a rate of approximately 130 bpm.  She also had isolated PVC.  Recommendation is for her to continue on beta-blocker therapy.\par She is on multiple medications for her blood pressure including losartan, metoprolol as well as diltiazem.  Her blood pressure was 142/72.  She will follow-up with her cardiologist/PCP.\par \par She has lost weight.\par \par Recommendation is to continue her on Eliquis given the fact that she has an elevated  [id number] score\par greater than 2.\par \par  [EKG obtained to assist in diagnosis and management of assessed problem(s)] : EKG obtained to assist in diagnosis and management of assessed problem(s)

## 2022-11-21 NOTE — HISTORY OF PRESENT ILLNESS
[FreeTextEntry1] : \par \par Patient is a 72-year-old woman who is seen in follow-up evaluation status post catheter ablation for A. fib January 27, 2022  at [facility].  \par Follow-up: No palpitations or symptoms of recurrent A. fib.  She is feeling well overall denies chest pain, shortness of breath, dizziness, lightness, syncope or presyncope.  \par She is currently on Eliquis, metoprolol, diltiazem losartan\par \par She had a follow-up monitor placed 9/28/2022 to 10/27/2022.  It showed a run of tachycardia on 10/2/2022 heart rate approximately 130 bpm.  This appeared to be in SVT/atrial tachycardia.  There were no atrial fibrillation.  She had isolated PVCs.\par \par  [id number] score : age ( 2), no diabetes), HTN ( 1), frmale\par \par She has a prior history of hypertension.  She has had previous complaints of irregular heartbeat.  She had a monitor placed August 2019 and was noted she had PAF with RVR.  She was treated with beta-blocker therapy.  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  She developed AMS.  She went to [facility] and was found to be in A. fib with RVR.  She had persistent atrial fibrillation and underwent cardioversion on 7/8/2021.  \par \par There is no prior history of diabetes.  She is a non-smoker and has alcohol very rarely socially.\par GERD/reflux for 30 yrs\par Question of remote silent Stroke by scan\par She is not aware if she has sleep apnea.  She still lives alone.  She does not describe daytime hypersomnolence.\par She has had a prior history of varicose veins and had vascular procedure.\par Echocardiogram from 7/17/2020 showed EF 65%, left atrial diameter 4.8 cm, left atrial volume index 45 cc/m².

## 2022-12-06 ENCOUNTER — APPOINTMENT (OUTPATIENT)
Dept: CARDIOLOGY | Facility: CLINIC | Age: 72
End: 2022-12-06

## 2022-12-06 VITALS
SYSTOLIC BLOOD PRESSURE: 140 MMHG | HEART RATE: 77 BPM | OXYGEN SATURATION: 98 % | TEMPERATURE: 98.2 F | DIASTOLIC BLOOD PRESSURE: 74 MMHG | BODY MASS INDEX: 30.8 KG/M2 | WEIGHT: 220 LBS | HEIGHT: 71 IN

## 2022-12-06 DIAGNOSIS — Z79.899 OTHER LONG TERM (CURRENT) DRUG THERAPY: ICD-10-CM

## 2022-12-06 PROCEDURE — 99214 OFFICE O/P EST MOD 30 MIN: CPT

## 2022-12-06 RX ORDER — SCOPOLAMINE 1.5 MG/1
1 PATCH, EXTENDED RELEASE TRANSDERMAL
Qty: 10 | Refills: 0 | Status: ACTIVE | COMMUNITY
Start: 2022-07-08

## 2022-12-06 RX ORDER — METOPROLOL TARTRATE 100 MG/1
100 TABLET, FILM COATED ORAL
Qty: 90 | Refills: 0 | Status: DISCONTINUED | COMMUNITY
Start: 2022-07-08 | End: 2022-12-06

## 2022-12-06 RX ORDER — DILTIAZEM HYDROCHLORIDE 180 MG/1
180 CAPSULE, EXTENDED RELEASE ORAL
Qty: 90 | Refills: 0 | Status: DISCONTINUED | COMMUNITY
Start: 2022-02-25 | End: 2022-12-06

## 2022-12-07 NOTE — ASSESSMENT
[FreeTextEntry1] : KEV GARSIA is a 72 year old F who presents today Dec 06, 2022 with the above history and the following active issues: \par \par PAF, ablation Jan 2022 [facility]\par + Symptomatic, hospitalized with RVR prior to ablation with no recent recurrence\par Seen by EP after 28 day monitor who deemed there was 1 brief episode of symptomatic atrial tachycardia but no clear A. fib\par Patient now on higher dose of Toprol 150 mg in p.m. which she will continue\par Advised pt to cont eliquis 5mg BID. \par Cont dilt 180mg in AM. \par Advised patient to please call our office if there is symptomatic recurrence of arrhythmia for consideration of implantable loop recorder for closer/long-term monitoring. \par \par HTN\par Reviewed BP goals less than 130/80 with patient \par She reports close home monitoring with PCP and reactive hypertension in office\par she will cont losartan 100mg daily and diltiazem, Toprol as above. \par Renal function and electrolytes are stable per recent labs. \par Low-sodium diet.  \par \par HLD\par mild to mod nonobx carotid atherosclerosis \par Continue statin therapy.  \par \par Chest discomfort resolved, atypical.  In the setting of atrial tachycardia now on higher dose of beta-blocker. \par There are no exertional complaints\par Recommend risk factor modification\par   \par Routine follow-up arranged with Dr. Martinez in 6 months\par Discussed exertional symptoms such as decreased exercise tolerance, chest discomfort, or shortness of breath which would warrant sooner evaluation/further investigation. \par Any questions and concerns were addressed and resolved. \par \par Sincerely,\par \par GAEL Muir\par Patients history, testing, and plan reviewed with supervising MD: Dr. Landry Martinez

## 2022-12-07 NOTE — HISTORY OF PRESENT ILLNESS
[FreeTextEntry1] : KEV GARSIA  is a 72 year old  F\par History of hypertension hyperlipidemia.\par Prior catheter ablation of atrial fibrillation Jan 2022 [facility].  There is no further atrial fibrillation.  Maintained on anticoagulation.  \par There is a reported history of mild stroke.\par She remains off amiodarone. \par \par There is no prior history of a clinical myocardial infarction, coronary revascularization. \par There is no history of symptomatic congestive heart failure \par \par There is minor bruising on Eliquis. \par She does report feeling better after her atrial fibrillation procedure.\par She has difficulty with the heat.\par She does have symptoms of dizziness.\par \par There is no exertional chest pain, pressure or discomfort. \par There is no significant dyspnea on exertion or orthopnea. \par There are no symptomatic palpitations or syncope.\par \par Bio tel event monitor - Urgent notified came through 10/2 that pt has recurrent AF/AFL on monitor. She was sleeping at the time had felt a sharp pain lasting about a min midsternal, thought she was sleeping wrong so turned then sx resolved. She went about her day gardening and felt remarkably well otherwise. \par EKG 10/7/22 normal sinus rhythm \par At time of urgent office visit her Toprol was increased 100-150 mg daily and there is no further recurrence of symptoms or arrhythmia on monitor thereafter. \par Interim also saw EP to fully review monitor we deemed that episode above was most likely atrial tachycardia not A. fib and no changes were made to the treatment plan.\par There is consideration of ILR\par \par Prior to her A. fib ablation she had been hospitalized with shortness of breath and RVR.\par No symptomatic recurrence in recent history\par Reports reactive hypertension, she reports she has home BP monitor that automatically transmits to Dr. Johnson's office daily.\par \par 7/26/22 hgb 13.7, k 4.2, cr 1, LDL 82\par January 2022 hemoglobin 13.2 creatinine 1.1 prior cholesterol 161 LDL 67 \par carotid 92022 mild to mod nonbx atherosclerosis BL\par stress test 2021 no ischemia \par last echocardiogram 5/2022 has normal left ventricular function mild mitral regurgitation mild aortic regurgitation left atrial enlargement \par prior MAL normal left ventricular function no clot patent foramen ovale \par ablation procedure in January.\par \par

## 2022-12-07 NOTE — ADDENDUM
[FreeTextEntry1] : Please note the patient was seen and examined with NP Paige Ruelas\par I was physically present during the service of the patient and personally examined the patient. \par I was directly involved in the management plan and recommendations of the care provided to the patient. \par I personally reviewed the history and physical examination as documented by the NP above.\par 12/06/2022\par

## 2023-02-08 ENCOUNTER — APPOINTMENT (OUTPATIENT)
Dept: BREAST CENTER | Facility: CLINIC | Age: 73
End: 2023-02-08
Payer: MEDICARE

## 2023-02-08 VITALS
SYSTOLIC BLOOD PRESSURE: 178 MMHG | HEIGHT: 71 IN | HEART RATE: 71 BPM | BODY MASS INDEX: 32.34 KG/M2 | OXYGEN SATURATION: 98 % | WEIGHT: 231 LBS | TEMPERATURE: 96.3 F | DIASTOLIC BLOOD PRESSURE: 78 MMHG

## 2023-02-08 DIAGNOSIS — Z78.9 OTHER SPECIFIED HEALTH STATUS: ICD-10-CM

## 2023-02-08 DIAGNOSIS — N64.4 MASTODYNIA: ICD-10-CM

## 2023-02-08 DIAGNOSIS — R07.89 OTHER CHEST PAIN: ICD-10-CM

## 2023-02-08 PROCEDURE — 99203 OFFICE O/P NEW LOW 30 MIN: CPT

## 2023-02-08 NOTE — CONSULT LETTER
[Dear  ___] : Dear  [unfilled], [Consult Letter:] : I had the pleasure of evaluating your patient, [unfilled]. [Please see my note below.] : Please see my note below. [Consult Closing:] : Thank you very much for allowing me to participate in the care of this patient.  If you have any questions, please do not hesitate to contact me. [Sincerely,] : Sincerely, [FreeTextEntry3] : Rachael Mendez MD

## 2023-02-08 NOTE — PAST MEDICAL HISTORY
[Menarche Age ____] : age at menarche was [unfilled] [Menopause Age____] : age at menopause was [unfilled] [Total Preg ___] : G[unfilled] [Live Births ___] : P[unfilled]  [Age At Live Birth ___] : Age at live birth: [unfilled] [History of Hormone Replacement Treatment] : has no history of hormone replacement treatment [FreeTextEntry8] : Did no breastfeed.

## 2023-02-08 NOTE — DATA REVIEWED
[FreeTextEntry1] : 8/30/22 Shasha Mosquera sMMG: TC 5.6%. Het dense. Scattered large, patchy foci of asymmetric FG tissue are noted bilaterally. Scattered nodularity is again noted bilaterally with the ductal elements in an underlying diffuse nodular pattern. Focal nodularity at the 12:00 axis of R breast appears unchanged. Scattered benign type calc deposits are noted. This includes multiple partially calcified lipid cysts within the R breast. No susp mass, susp microcalcs, or other sign of malignancy is identified. Rec mmg in 1yr. BR2\par

## 2023-02-08 NOTE — PHYSICAL EXAM
[Bra Size: ___] : Bra Size: [unfilled] [Normocephalic] : normocephalic [Atraumatic] : atraumatic [Supple] : supple [No Supraclavicular Adenopathy] : no supraclavicular adenopathy [Examined in the supine and seated position] : examined in the supine and seated position [Symmetrical] : symmetrical [No dominant masses] : no dominant masses in right breast  [No dominant masses] : no dominant masses left breast [No Nipple Retraction] : no left nipple retraction [No Nipple Discharge] : no left nipple discharge [No Axillary Lymphadenopathy] : no left axillary lymphadenopathy [No Edema] : no edema [No Swelling] : no swelling [Full ROM] : full range of motion [No Rashes] : no rashes [No Ulceration] : no ulceration

## 2023-02-08 NOTE — REVIEW OF SYSTEMS
[Heart Rate Is Fast] : fast heart rate [Negative] : Heme/Lymph [FreeTextEntry5] : on betablocker [FreeTextEntry7] : Reflux [de-identified] : Migraines [de-identified] : On eliquis

## 2023-02-08 NOTE — ASSESSMENT
[FreeTextEntry1] : PCP Dr. Lorena Johnson\par \par Patient is a 72 year old female here today for consultation for L mastalgia and occasional numbness down left arm. She states intermittent L sided breast pain and L arm tingling/nubness started about a year ago. She states relief with tylenol and when she wears a better support bra. She states history of cystic breast with many cyst aspirations when she was younger, the last one was about 2 years ago on the R side. She does not drink coffee but states she has about 20oz of cola a day. \par Pt denies any breast lesions, discharge or masses.\par \par She sees cardiology for history of Afib, she said she is s/p ablation and no longer has afib but has been experiencing tachycardia up to the 130's.\par \par 8/30/22 Eveline, Shasha sMMG: TC 5.6%. Het dense. Scattered large, patchy foci of asymmetric FG tissue are noted bilaterally. Scattered nodularity is again noted bilaterally with the ductal elements in an underlying diffuse nodular pattern. Focal nodularity at the 12:00 axis of R breast appears unchanged. Scattered benign type calc deposits are noted. This includes multiple partially calcified lipid cysts within the R breast. No susp mass, susp microcalcs, or other sign of malignancy is identified. Rec mmg in 1yr. BR2\par \par Fhx: Leukemia- Brother 28\par CBE: 42C, Bilat pendulous breasts. No suspicious findings. On exam, the discomfort appears to be more chest wall discomfort. No axillary or SC lymphadenopathy.\par Reviewed studies and CBE with pt, no suspicious findings and pt reassured. Symptoms appears to be more chest wall MSK discomfort. Pt cannot tolerate NSAIDs. She has been ruled out for cardiac causes and sees cardiologist. Rec continue to wear support bra since helps her symptoms. Rec annual screenings and may resume with PCP/GYN.

## 2023-02-08 NOTE — HISTORY OF PRESENT ILLNESS
[FreeTextEntry1] : PCP Dr. Lorena Johnson\par \par Patient is a 72 year old female here today for consultation for L mastalgia and occasional numbness down left arm. She states intermittent L sided breast pain and L arm tingling/nubness started about a year ago. She states relief with tylenol and when she wears a better support bra. She states history of cystic breast with many cyst aspirations when she was younger, the last one was about 2 years ago on the R side. She does not drink coffee but states she has about 20oz of cola a day. \par Pt denies any breast lesions, discharge or masses.\par \par She sees cardiology for history of Afib, she said she is s/p ablation and no longer has afib but has been experiencing tachycardia up to the 130's.\par \par 8/30/22 Shasha Mosquera sMMG: TC 5.6%. Het dense. Scattered large, patchy foci of asymmetric FG tissue are noted bilaterally. Scattered nodularity is again noted bilaterally with the ductal elements in an underlying diffuse nodular pattern. Focal nodularity at the 12:00 axis of R breast appears unchanged. Scattered benign type calc deposits are noted. This includes multiple partially calcified lipid cysts within the R breast. No susp mass, susp microcalcs, or other sign of malignancy is identified. Rec mmg in 1yr. BR2\par \par Fhx: Leukemia- Brother 28

## 2023-02-24 RX ORDER — DILTIAZEM HYDROCHLORIDE 180 MG/1
180 CAPSULE, EXTENDED RELEASE ORAL DAILY
Qty: 90 | Refills: 3 | Status: ACTIVE | COMMUNITY
Start: 1900-01-01 | End: 1900-01-01

## 2023-03-22 ENCOUNTER — NON-APPOINTMENT (OUTPATIENT)
Age: 73
End: 2023-03-22

## 2023-04-05 ENCOUNTER — NON-APPOINTMENT (OUTPATIENT)
Age: 73
End: 2023-04-05

## 2023-04-19 ENCOUNTER — APPOINTMENT (OUTPATIENT)
Dept: CARDIOLOGY | Facility: CLINIC | Age: 73
End: 2023-04-19
Payer: MEDICARE

## 2023-04-19 VITALS
SYSTOLIC BLOOD PRESSURE: 132 MMHG | BODY MASS INDEX: 32.34 KG/M2 | HEART RATE: 73 BPM | WEIGHT: 231 LBS | DIASTOLIC BLOOD PRESSURE: 82 MMHG | HEIGHT: 71 IN | OXYGEN SATURATION: 97 %

## 2023-04-19 VITALS — BODY MASS INDEX: 32.08 KG/M2 | WEIGHT: 230 LBS

## 2023-04-19 PROCEDURE — 99024 POSTOP FOLLOW-UP VISIT: CPT

## 2023-04-19 PROCEDURE — 93291 INTERROG DEV EVAL SCRMS IP: CPT

## 2023-04-21 ENCOUNTER — RX RENEWAL (OUTPATIENT)
Age: 73
End: 2023-04-21

## 2023-05-18 ENCOUNTER — APPOINTMENT (OUTPATIENT)
Dept: CARDIOLOGY | Facility: CLINIC | Age: 73
End: 2023-05-18
Payer: MEDICARE

## 2023-05-18 ENCOUNTER — NON-APPOINTMENT (OUTPATIENT)
Age: 73
End: 2023-05-18

## 2023-05-18 PROCEDURE — 93298 REM INTERROG DEV EVAL SCRMS: CPT

## 2023-05-18 PROCEDURE — G2066: CPT

## 2023-06-06 ENCOUNTER — APPOINTMENT (OUTPATIENT)
Dept: CARDIOLOGY | Facility: CLINIC | Age: 73
End: 2023-06-06
Payer: MEDICARE

## 2023-06-06 VITALS
WEIGHT: 225 LBS | SYSTOLIC BLOOD PRESSURE: 136 MMHG | DIASTOLIC BLOOD PRESSURE: 80 MMHG | BODY MASS INDEX: 31.5 KG/M2 | HEART RATE: 69 BPM | HEIGHT: 71 IN | OXYGEN SATURATION: 98 %

## 2023-06-06 PROCEDURE — 99214 OFFICE O/P EST MOD 30 MIN: CPT

## 2023-06-14 ENCOUNTER — OFFICE (OUTPATIENT)
Dept: URBAN - METROPOLITAN AREA CLINIC 8 | Facility: CLINIC | Age: 73
Setting detail: OPHTHALMOLOGY
End: 2023-06-14
Payer: MEDICARE

## 2023-06-14 DIAGNOSIS — H40.1131: ICD-10-CM

## 2023-06-14 DIAGNOSIS — H02.825: ICD-10-CM

## 2023-06-14 DIAGNOSIS — H02.834: ICD-10-CM

## 2023-06-14 DIAGNOSIS — H02.831: ICD-10-CM

## 2023-06-14 DIAGNOSIS — H02.822: ICD-10-CM

## 2023-06-14 DIAGNOSIS — H25.13: ICD-10-CM

## 2023-06-14 PROCEDURE — 99214 OFFICE O/P EST MOD 30 MIN: CPT | Performed by: OPHTHALMOLOGY

## 2023-06-14 PROCEDURE — 92133 CPTRZD OPH DX IMG PST SGM ON: CPT | Performed by: OPHTHALMOLOGY

## 2023-06-14 ASSESSMENT — REFRACTION_CURRENTRX
OD_VPRISM_DIRECTION: SV
OS_VPRISM_DIRECTION: SV
OS_SPHERE: +2.75
OD_AXIS: 081
OS_SPHERE: +2.50
OS_VPRISM_DIRECTION: SV
OD_SPHERE: +2.75
OS_OVR_VA: 20/
OD_OVR_VA: 20/
OD_CYLINDER: -0.50
OD_VPRISM_DIRECTION: SV
OS_AXIS: 094
OS_OVR_VA: 20/
OS_CYLINDER: -0.25
OD_OVR_VA: 20/
OD_SPHERE: +2.75

## 2023-06-14 ASSESSMENT — KERATOMETRY
OD_AXISANGLE_DEGREES: 100
OS_K1POWER_DIOPTERS: 43.50
OS_AXISANGLE_DEGREES: 065
OD_K2POWER_DIOPTERS: 44.00
OD_K1POWER_DIOPTERS: 43.25
OS_K2POWER_DIOPTERS: 44.00
METHOD_AUTO_MANUAL: AUTO

## 2023-06-14 ASSESSMENT — REFRACTION_MANIFEST
OS_VA1: 20/20-
OD_SPHERE: +1.75
OS_VA1: 20/20-
OS_CYLINDER: -0.25
OU_VA: 20/20
OS_ADD: +2.75
OS_ADD: +1.75
OD_CYLINDER: -0.75
OD_VA2: 20/25(J1)
OS_SPHERE: +1.75
OD_AXIS: 75
OS_VA2: 20/25(J1)
OD_AXIS: 075
OS_AXIS: 115
OS_AXIS: 115
OD_VA1: 20/20
OD_CYLINDER: -0.75
OD_VA2: 20/20(J1+)
OS_VA2: 20/20(J1+)
OD_ADD: +1.75
OS_SPHERE: +1.50
OS_CYLINDER: -0.25
OD_SPHERE: +2.00
OD_ADD: +2.75
OD_VA1: 20/20

## 2023-06-14 ASSESSMENT — SPHEQUIV_DERIVED
OD_SPHEQUIV: 1.625
OD_SPHEQUIV: 2.5
OS_SPHEQUIV: 1.375
OD_SPHEQUIV: 1.375
OS_SPHEQUIV: 2.125
OS_SPHEQUIV: 1.625

## 2023-06-14 ASSESSMENT — PACHYMETRY
OS_CT_UM: 519
OD_CT_UM: 520
OS_CT_CORRECTION: 2
OD_CT_CORRECTION: 1

## 2023-06-14 ASSESSMENT — REFRACTION_AUTOREFRACTION
OD_AXIS: 072
OD_SPHERE: +3.00
OS_CYLINDER: -0.25
OS_AXIS: 124
OS_SPHERE: +2.25
OD_CYLINDER: -1.00

## 2023-06-14 ASSESSMENT — AXIALLENGTH_DERIVED
OS_AL: 22.7067
OS_AL: 22.8886
OS_AL: 22.9806
OD_AL: 22.6142
OD_AL: 23.0243
OD_AL: 22.9319

## 2023-06-14 ASSESSMENT — CONFRONTATIONAL VISUAL FIELD TEST (CVF)
OD_FINDINGS: FULL
OS_FINDINGS: FULL

## 2023-06-14 ASSESSMENT — VISUAL ACUITY
OS_BCVA: 20/60-1
OD_BCVA: 20/70-2

## 2023-06-14 ASSESSMENT — TONOMETRY
OS_IOP_MMHG: 14
OD_IOP_MMHG: 14

## 2023-06-14 ASSESSMENT — LID POSITION - DERMATOCHALASIS
OD_DERMATOCHALASIS: RUL T
OS_DERMATOCHALASIS: LUL T

## 2023-06-20 ENCOUNTER — NON-APPOINTMENT (OUTPATIENT)
Age: 73
End: 2023-06-20

## 2023-06-20 ENCOUNTER — APPOINTMENT (OUTPATIENT)
Dept: CARDIOLOGY | Facility: CLINIC | Age: 73
End: 2023-06-20
Payer: MEDICARE

## 2023-06-20 PROCEDURE — G2066: CPT

## 2023-06-20 PROCEDURE — 93298 REM INTERROG DEV EVAL SCRMS: CPT

## 2023-06-28 ENCOUNTER — APPOINTMENT (OUTPATIENT)
Dept: CARDIOLOGY | Facility: CLINIC | Age: 73
End: 2023-06-28
Payer: MEDICARE

## 2023-06-28 VITALS
BODY MASS INDEX: 31.36 KG/M2 | HEART RATE: 72 BPM | HEIGHT: 71 IN | OXYGEN SATURATION: 98 % | SYSTOLIC BLOOD PRESSURE: 136 MMHG | WEIGHT: 224 LBS | DIASTOLIC BLOOD PRESSURE: 60 MMHG

## 2023-06-28 PROCEDURE — 93291 INTERROG DEV EVAL SCRMS IP: CPT

## 2023-07-03 NOTE — HISTORY OF PRESENT ILLNESS
[FreeTextEntry1] : KEV GARSIA  is a 73 year old  F\par \par \par History of hypertension hyperlipidemia.\par Prior to her A. fib ablation she had been hospitalized with shortness of breath and RVR.\par Prior catheter ablation of atrial fibrillation Jan 2022 RJ.  There is no further atrial fibrillation.  Maintained on anticoagulation.  \par There is a reported history of TIA.\par She remains off amiodarone. \par \par There is no prior history of a clinical myocardial infarction, coronary revascularization. \par There is no history of symptomatic congestive heart failure \par \par There is minor bruising on Eliquis. \par She does report feeling better after her atrial fibrillation procedure.\par Her symptoms have improved on the higher dose of beta-blocker.\par Minor symptoms of dizziness.\par There is dyspnea on exertion when she climbs the stairs.\par There is no exertional chest pain, pressure or discomfort. \par There is no orthopnea. \par There are no symptomatic palpitations or syncope.\par Typically her blood pressure is in the 130s. \par There are no bleeding issues.\par \par Labs March 2023 creatinine 1.0 total cholesterol 141 LDL 61 hemoglobin 14.1 \par last device check demonstrates no events \par EKG 10/7/22 normal sinus rhythm \par carotid 84821 mild to mod nonbx atherosclerosis BL\par stress test 2021 no ischemia \par last echocardiogram 5/2022 has normal left ventricular function mild mitral regurgitation mild aortic regurgitation left atrial enlargement \par prior MAL normal left ventricular function no clot patent foramen ovale \par ablation procedure in January.\par  No

## 2023-07-05 PROBLEM — H40.1132 POAG; BOTH EYES MODERATE: Status: ACTIVE | Noted: 2023-07-05

## 2023-07-21 ENCOUNTER — APPOINTMENT (OUTPATIENT)
Dept: CARDIOLOGY | Facility: CLINIC | Age: 73
End: 2023-07-21

## 2023-07-31 ENCOUNTER — APPOINTMENT (OUTPATIENT)
Dept: CARDIOLOGY | Facility: CLINIC | Age: 73
End: 2023-07-31
Payer: MEDICARE

## 2023-07-31 ENCOUNTER — NON-APPOINTMENT (OUTPATIENT)
Age: 73
End: 2023-07-31

## 2023-07-31 PROCEDURE — G2066: CPT

## 2023-07-31 PROCEDURE — 93298 REM INTERROG DEV EVAL SCRMS: CPT

## 2023-08-31 ENCOUNTER — NON-APPOINTMENT (OUTPATIENT)
Age: 73
End: 2023-08-31

## 2023-08-31 ENCOUNTER — APPOINTMENT (OUTPATIENT)
Dept: CARDIOLOGY | Facility: CLINIC | Age: 73
End: 2023-08-31
Payer: MEDICARE

## 2023-08-31 PROCEDURE — 93298 REM INTERROG DEV EVAL SCRMS: CPT

## 2023-08-31 PROCEDURE — G2066: CPT

## 2023-09-08 ENCOUNTER — APPOINTMENT (OUTPATIENT)
Dept: CARDIOLOGY | Facility: CLINIC | Age: 73
End: 2023-09-08
Payer: MEDICARE

## 2023-09-08 PROCEDURE — 93306 TTE W/DOPPLER COMPLETE: CPT

## 2023-09-08 PROCEDURE — 76376 3D RENDER W/INTRP POSTPROCES: CPT

## 2023-09-08 PROCEDURE — 93979 VASCULAR STUDY: CPT

## 2023-09-15 ENCOUNTER — APPOINTMENT (OUTPATIENT)
Dept: CARDIOLOGY | Facility: CLINIC | Age: 73
End: 2023-09-15
Payer: MEDICARE

## 2023-09-15 VITALS
SYSTOLIC BLOOD PRESSURE: 130 MMHG | HEIGHT: 71 IN | HEART RATE: 65 BPM | WEIGHT: 222 LBS | BODY MASS INDEX: 31.08 KG/M2 | OXYGEN SATURATION: 98 % | DIASTOLIC BLOOD PRESSURE: 70 MMHG

## 2023-09-15 PROCEDURE — 99213 OFFICE O/P EST LOW 20 MIN: CPT

## 2023-09-18 PROBLEM — H40.1122 POAG; RIGHT EYE MODERATE, LEFT EYE MODERATE: Status: ACTIVE | Noted: 2023-09-11

## 2023-09-18 PROBLEM — H40.1112 POAG; RIGHT EYE MODERATE, LEFT EYE MODERATE: Status: ACTIVE | Noted: 2023-09-11

## 2023-09-27 PROBLEM — H52.7 REFRACTIVE ERROR ; BOTH EYES: Status: ACTIVE | Noted: 2023-09-27

## 2023-10-03 ENCOUNTER — APPOINTMENT (OUTPATIENT)
Dept: CARDIOLOGY | Facility: CLINIC | Age: 73
End: 2023-10-03
Payer: MEDICARE

## 2023-10-03 ENCOUNTER — NON-APPOINTMENT (OUTPATIENT)
Age: 73
End: 2023-10-03

## 2023-10-03 PROCEDURE — 93298 REM INTERROG DEV EVAL SCRMS: CPT

## 2023-10-03 PROCEDURE — G2066: CPT

## 2023-10-10 RX ORDER — METOPROLOL SUCCINATE 50 MG/1
50 TABLET, EXTENDED RELEASE ORAL DAILY
Qty: 90 | Refills: 3 | Status: ACTIVE | COMMUNITY
Start: 2022-10-07 | End: 1900-01-01

## 2023-10-11 ENCOUNTER — APPOINTMENT (OUTPATIENT)
Dept: RADIOLOGY | Facility: CLINIC | Age: 73
End: 2023-10-11
Payer: MEDICARE

## 2023-10-11 ENCOUNTER — RESULT REVIEW (OUTPATIENT)
Age: 73
End: 2023-10-11

## 2023-10-11 ENCOUNTER — APPOINTMENT (OUTPATIENT)
Dept: MAMMOGRAPHY | Facility: CLINIC | Age: 73
End: 2023-10-11
Payer: MEDICARE

## 2023-10-11 ENCOUNTER — APPOINTMENT (OUTPATIENT)
Dept: ULTRASOUND IMAGING | Facility: CLINIC | Age: 73
End: 2023-10-11
Payer: MEDICARE

## 2023-10-11 PROCEDURE — 77080 DXA BONE DENSITY AXIAL: CPT

## 2023-10-11 PROCEDURE — 73502 X-RAY EXAM HIP UNI 2-3 VIEWS: CPT | Mod: RT

## 2023-10-11 PROCEDURE — 72110 X-RAY EXAM L-2 SPINE 4/>VWS: CPT

## 2023-10-11 PROCEDURE — 76641 ULTRASOUND BREAST COMPLETE: CPT | Mod: 50,GY

## 2023-10-11 PROCEDURE — 77063 BREAST TOMOSYNTHESIS BI: CPT

## 2023-10-11 PROCEDURE — 77067 SCR MAMMO BI INCL CAD: CPT

## 2023-10-23 PROBLEM — H40.1132 POAG; BOTH EYES MODERATE: Status: ACTIVE | Noted: 2023-10-23

## 2023-10-23 RX ORDER — APIXABAN 5 MG/1
5 TABLET, FILM COATED ORAL
Qty: 180 | Refills: 3 | Status: ACTIVE | COMMUNITY
Start: 2019-08-22 | End: 1900-01-01

## 2023-10-23 RX ORDER — METOPROLOL SUCCINATE 100 MG/1
100 TABLET, EXTENDED RELEASE ORAL
Qty: 90 | Refills: 3 | Status: ACTIVE | COMMUNITY
Start: 1900-01-01 | End: 1900-01-01

## 2023-11-03 ENCOUNTER — NON-APPOINTMENT (OUTPATIENT)
Age: 73
End: 2023-11-03

## 2023-11-03 ENCOUNTER — APPOINTMENT (OUTPATIENT)
Dept: CARDIOLOGY | Facility: CLINIC | Age: 73
End: 2023-11-03
Payer: MEDICARE

## 2023-11-03 PROCEDURE — 93298 REM INTERROG DEV EVAL SCRMS: CPT

## 2023-11-03 PROCEDURE — G2066: CPT | Mod: NC

## 2023-11-20 ENCOUNTER — APPOINTMENT (OUTPATIENT)
Age: 73
End: 2023-11-20
Payer: MEDICARE

## 2023-11-20 PROCEDURE — 72148 MRI LUMBAR SPINE W/O DYE: CPT | Mod: MH

## 2023-12-04 ENCOUNTER — APPOINTMENT (OUTPATIENT)
Dept: CARDIOLOGY | Facility: CLINIC | Age: 73
End: 2023-12-04
Payer: MEDICARE

## 2023-12-04 ENCOUNTER — NON-APPOINTMENT (OUTPATIENT)
Age: 73
End: 2023-12-04

## 2023-12-04 PROCEDURE — 93298 REM INTERROG DEV EVAL SCRMS: CPT

## 2023-12-04 PROCEDURE — G2066: CPT | Mod: NC

## 2023-12-18 ENCOUNTER — OFFICE (OUTPATIENT)
Dept: URBAN - METROPOLITAN AREA CLINIC 8 | Facility: CLINIC | Age: 73
Setting detail: OPHTHALMOLOGY
End: 2023-12-18
Payer: MEDICARE

## 2023-12-18 DIAGNOSIS — H02.834: ICD-10-CM

## 2023-12-18 DIAGNOSIS — H02.831: ICD-10-CM

## 2023-12-18 DIAGNOSIS — H02.822: ICD-10-CM

## 2023-12-18 DIAGNOSIS — H40.1131: ICD-10-CM

## 2023-12-18 DIAGNOSIS — H02.825: ICD-10-CM

## 2023-12-18 DIAGNOSIS — H25.13: ICD-10-CM

## 2023-12-18 PROCEDURE — 92083 EXTENDED VISUAL FIELD XM: CPT | Performed by: OPHTHALMOLOGY

## 2023-12-18 PROCEDURE — 92014 COMPRE OPH EXAM EST PT 1/>: CPT | Performed by: OPHTHALMOLOGY

## 2023-12-18 ASSESSMENT — REFRACTION_AUTOREFRACTION
OD_CYLINDER: -1.00
OS_AXIS: 124
OD_SPHERE: +3.00
OS_CYLINDER: -0.25
OS_SPHERE: +2.25
OD_AXIS: 072

## 2023-12-18 ASSESSMENT — REFRACTION_MANIFEST
OD_ADD: +2.75
OU_VA: 20/20
OS_VA1: 20/20-
OS_AXIS: 115
OS_SPHERE: +1.50
OS_ADD: +2.75
OS_CYLINDER: -0.25
OD_VA1: 20/20
OD_ADD: +1.75
OS_SPHERE: +1.75
OD_CYLINDER: -0.75
OD_VA1: 20/20
OS_AXIS: 115
OD_SPHERE: +2.00
OD_AXIS: 075
OD_VA2: 20/25(J1)
OD_CYLINDER: -0.75
OS_VA1: 20/20-
OS_CYLINDER: -0.25
OD_SPHERE: +1.75
OS_VA2: 20/25(J1)
OS_VA2: 20/20(J1+)
OD_AXIS: 75
OD_VA2: 20/20(J1+)
OS_ADD: +1.75

## 2023-12-18 ASSESSMENT — SPHEQUIV_DERIVED
OD_SPHEQUIV: 1.375
OD_SPHEQUIV: 2.5
OS_SPHEQUIV: 1.375
OD_SPHEQUIV: 1.625
OS_SPHEQUIV: 1.625
OS_SPHEQUIV: 2.125

## 2023-12-18 ASSESSMENT — REFRACTION_CURRENTRX
OD_VPRISM_DIRECTION: SV
OS_CYLINDER: -0.25
OS_AXIS: 094
OS_OVR_VA: 20/
OS_SPHERE: +2.50
OS_SPHERE: +2.75
OD_OVR_VA: 20/
OD_SPHERE: +2.75
OD_SPHERE: +2.75
OD_CYLINDER: -0.50
OD_VPRISM_DIRECTION: SV
OD_OVR_VA: 20/
OD_AXIS: 081
OS_VPRISM_DIRECTION: SV
OS_VPRISM_DIRECTION: SV
OS_OVR_VA: 20/

## 2023-12-18 ASSESSMENT — LID POSITION - DERMATOCHALASIS
OS_DERMATOCHALASIS: LUL T
OD_DERMATOCHALASIS: RUL T

## 2023-12-18 ASSESSMENT — CONFRONTATIONAL VISUAL FIELD TEST (CVF)
OS_FINDINGS: FULL
OD_FINDINGS: FULL

## 2024-01-08 ENCOUNTER — APPOINTMENT (OUTPATIENT)
Dept: CARDIOLOGY | Facility: CLINIC | Age: 74
End: 2024-01-08
Payer: MEDICARE

## 2024-01-08 ENCOUNTER — NON-APPOINTMENT (OUTPATIENT)
Age: 74
End: 2024-01-08

## 2024-01-08 PROCEDURE — 93298 REM INTERROG DEV EVAL SCRMS: CPT

## 2024-01-09 ENCOUNTER — RX RENEWAL (OUTPATIENT)
Age: 74
End: 2024-01-09

## 2024-01-12 ENCOUNTER — NON-APPOINTMENT (OUTPATIENT)
Age: 74
End: 2024-01-12

## 2024-01-12 ENCOUNTER — APPOINTMENT (OUTPATIENT)
Dept: CARDIOLOGY | Facility: CLINIC | Age: 74
End: 2024-01-12
Payer: MEDICARE

## 2024-01-12 VITALS
SYSTOLIC BLOOD PRESSURE: 126 MMHG | DIASTOLIC BLOOD PRESSURE: 60 MMHG | BODY MASS INDEX: 30.24 KG/M2 | HEART RATE: 70 BPM | HEIGHT: 71 IN | OXYGEN SATURATION: 97 % | WEIGHT: 216 LBS

## 2024-01-12 DIAGNOSIS — Z95.818 PRESENCE OF OTHER CARDIAC IMPLANTS AND GRAFTS: ICD-10-CM

## 2024-01-12 PROCEDURE — 93000 ELECTROCARDIOGRAM COMPLETE: CPT

## 2024-01-12 PROCEDURE — 99214 OFFICE O/P EST MOD 30 MIN: CPT

## 2024-01-12 RX ORDER — LOSARTAN POTASSIUM 100 MG/1
100 TABLET, FILM COATED ORAL DAILY
Qty: 90 | Refills: 3 | Status: DISCONTINUED | COMMUNITY
Start: 2021-01-06 | End: 2024-01-12

## 2024-01-12 RX ORDER — METHYLPREDNISOLONE 4 MG/1
4 TABLET ORAL
Refills: 0 | Status: DISCONTINUED | COMMUNITY

## 2024-01-12 RX ORDER — ROSUVASTATIN CALCIUM 5 MG/1
5 TABLET, FILM COATED ORAL
Qty: 90 | Refills: 3 | Status: ACTIVE | COMMUNITY
Start: 2021-12-13 | End: 1900-01-01

## 2024-01-13 NOTE — HISTORY OF PRESENT ILLNESS
[FreeTextEntry1] : KEV GARSIA  is a 73 year old  F Follow-up carotid Doppler and stress test last office visit noninvasive testing was recommended.  Abdominal ultrasound had no aneurysm.  Echocardiogram is normal left ventricular function with left atrial enlargement mild valvular heart disease.  EKG demonstrates normal sinus rhythm with poor R wave progression.  Recent device checks have been within normal limits.  Her primary physician stopped angiotensin receptor blocker.  She reports feeling spacey and had hypoglycemia.  There is dyspnea on exertion when she climbs stairs.  There are no bleeding issues.  There is clinical improvement with epidural.  She notes visual changes.  On my exam her blood pressure is 132/72.  Blood work November 2023 hemoglobin 13.4 creatinine 1.1 LDL 61 January 2024 hemoglobin 14.0 A1c 5.6 creatinine 1.1 LDL 91 recent outside note reviewed.  She was seen for lightheadedness. History of hypertension hyperlipidemia. Prior to her A. fib ablation she had been hospitalized with shortness of breath and RVR. Prior catheter ablation of atrial fibrillation Jan 2022 RJ.   Maintained on anticoagulation.   There is a reported history of TIA. She remains off amiodarone.   There is no prior history of a clinical myocardial infarction, coronary revascularization.  There is no history of symptomatic congestive heart failure   There is minor bruising on Eliquis.  She does report feeling better after her atrial fibrillation procedure. Her symptoms have improved on the higher dose of beta-blocker. Minor symptoms of dizziness. There is dyspnea on exertion when she climbs the stairs. There is no exertional chest pain, pressure or discomfort.  There is no orthopnea.  There are no symptomatic palpitations or syncope. There are no bleeding issues.  March 2023 creatinine 1.0 total cholesterol 141 LDL 61 hemoglobin 14.1  carotid 00363 mild to mod nonbx atherosclerosis BL stress test 2021 no ischemia  echocardiogram 5/2022 has normal left ventricular function mild mitral regurgitation mild aortic regurgitation left atrial enlargement  prior MAL normal left ventricular function no clot patent foramen ovale  ablation procedure in January.  Continue anticoagulation antihypertensive and lipid-lowering therapy. Follow-up loop recorder.  PAF, ablation Jan 2022 RJ + Symptomatic, hospitalized with RVR prior to ablation with no recent recurrence Seen by EP after 28 day monitor who deemed there was 1 brief episode of symptomatic atrial tachycardia but no clear A. fib now on higher dose of tropol eliquis 5mg BID. dilt 180mg in AM.  HTN Reviewed BP goals less than 130/80 with patient cont losartan and diltiazem, Toprol as above. Low-sodium diet.  HL mild to mod nonobx carotid atherosclerosis statin  Chest discomfort resolved, atypical. In the setting of atrial tachycardia now on higher dose of beta-blocker. There are no exertional complaints Recommend risk factor modification  Discussed exertional symptoms such as decreased exercise tolerance, chest discomfort, or shortness of breath which would warrant sooner evaluation/further investigation. Any questions and concerns were addressed and resolved.

## 2024-01-22 ENCOUNTER — APPOINTMENT (OUTPATIENT)
Dept: CARDIOLOGY | Facility: CLINIC | Age: 74
End: 2024-01-22
Payer: MEDICARE

## 2024-01-22 PROCEDURE — A9502: CPT

## 2024-01-22 PROCEDURE — 93015 CV STRESS TEST SUPVJ I&R: CPT

## 2024-01-22 PROCEDURE — 78452 HT MUSCLE IMAGE SPECT MULT: CPT

## 2024-01-22 PROCEDURE — 93880 EXTRACRANIAL BILAT STUDY: CPT

## 2024-01-29 PROBLEM — H40.1132 POAG; BOTH EYES MODERATE: Status: ACTIVE | Noted: 2024-01-29

## 2024-02-02 ENCOUNTER — APPOINTMENT (OUTPATIENT)
Dept: CARDIOLOGY | Facility: CLINIC | Age: 74
End: 2024-02-02
Payer: MEDICARE

## 2024-02-02 VITALS
DIASTOLIC BLOOD PRESSURE: 70 MMHG | WEIGHT: 225 LBS | SYSTOLIC BLOOD PRESSURE: 136 MMHG | BODY MASS INDEX: 31.5 KG/M2 | HEIGHT: 71 IN | OXYGEN SATURATION: 98 % | HEART RATE: 69 BPM

## 2024-02-02 DIAGNOSIS — Z79.01 LONG TERM (CURRENT) USE OF ANTICOAGULANTS: ICD-10-CM

## 2024-02-02 DIAGNOSIS — R06.09 OTHER FORMS OF DYSPNEA: ICD-10-CM

## 2024-02-02 DIAGNOSIS — R94.31 ABNORMAL ELECTROCARDIOGRAM [ECG] [EKG]: ICD-10-CM

## 2024-02-02 DIAGNOSIS — I10 ESSENTIAL (PRIMARY) HYPERTENSION: ICD-10-CM

## 2024-02-02 DIAGNOSIS — Z86.79 OTHER SPECIFIED POSTPROCEDURAL STATES: ICD-10-CM

## 2024-02-02 DIAGNOSIS — E78.00 PURE HYPERCHOLESTEROLEMIA, UNSPECIFIED: ICD-10-CM

## 2024-02-02 DIAGNOSIS — I35.1 NONRHEUMATIC AORTIC (VALVE) INSUFFICIENCY: ICD-10-CM

## 2024-02-02 DIAGNOSIS — Z98.890 OTHER SPECIFIED POSTPROCEDURAL STATES: ICD-10-CM

## 2024-02-02 PROCEDURE — 99214 OFFICE O/P EST MOD 30 MIN: CPT

## 2024-02-02 NOTE — HISTORY OF PRESENT ILLNESS
[FreeTextEntry1] : KEV GARSIA  is a 73 year old  F  History of hypertension hyperlipidemia. Prior to her A. fib ablation she had been hospitalized with shortness of breath and RVR. Prior catheter ablation of atrial fibrillation Jan 2022 RJ. Maintained on anticoagulation. There is a reported history of TIA. She remains off amiodarone.  There is no prior history of a clinical myocardial infarction, coronary revascularization. There is no history of symptomatic congestive heart failure  There is minor bruising on Eliquis. She does report feeling better after her atrial fibrillation procedure. There is no exertional chest pain, pressure or discomfort. There is no orthopnea. There are no symptomatic palpitations or syncope.  Her primary physician stopped angiotensin receptor blocker.  recent outside note reviewed. She was seen for lightheadedness. She reports feeling spacey and had hypoglycemia.  She notes visual changes.  There is dyspnea on exertion when she climbs stairs.  There are no bleeding issues.  There is clinical improvement with epidural.   On my exam her blood pressure is  136/70 Blood work November 2023 hemoglobin 13.4 creatinine 1.1 LDL 61  January 2024 hemoglobin 14.0 A1c 5.6 creatinine 1.1 LDL 91   Nuclear stress test 1/2024 normal myocardial perfusion, no evidence of infarction or inducible ischemia  Carotid doppler 1/2024 mild to mod nonobx atherosclerosis BL  Abdominal ultrasound 9/2023 had no aneurysm.  Echocardiogram 9/2023 is normal left ventricular function with left atrial enlargement mild valvular heart disease.  EKG demonstrates normal sinus rhythm with poor R wave progression.  Recent device checks have been within normal limits.   prior MAL normal left ventricular function no clot patent foramen ovale ablation procedure in January.

## 2024-02-02 NOTE — ASSESSMENT
[FreeTextEntry1] : KEV GARSIA is a 73 year old F who presents today Feb 02, 2024 with the above history and the following active issues:   Lightheadedness resolved ILR no recent events Carotid no severe stenosis and stress test no ischemia rec to stay hydrated avoid quick change in position cont ILR monitoring   PAF, ablation Jan 2022 RJ + Symptomatic, hospitalized with RVR prior to ablation with no recent recurrence Seen by EP after 28 day monitor who deemed there was 1 brief episode of symptomatic atrial tachycardia but no clear A. fib now on higher dose of tropol eliquis 5mg BID. dilt 180mg in AM.  HTN Reviewed BP goals  monitor at home note off losartan per PCP - call if persistently elevated home readings and followup PCP if lower dose needed   diltiazem, Toprol as above. Low-sodium diet.  HL mild to mod nonobx carotid atherosclerosis unchanged  cont statin   Discussed exertional symptoms such as decreased exercise tolerance, chest discomfort, or shortness of breath which would warrant sooner evaluation/further investigation. Any questions and concerns were addressed and resolved.  Sincerely,  GAEL Lira Patients history, testing, and plan reviewed with supervising MD: Dr. Landry Martinez

## 2024-02-08 ENCOUNTER — APPOINTMENT (OUTPATIENT)
Dept: CARDIOLOGY | Facility: CLINIC | Age: 74
End: 2024-02-08
Payer: MEDICARE

## 2024-02-08 ENCOUNTER — NON-APPOINTMENT (OUTPATIENT)
Age: 74
End: 2024-02-08

## 2024-02-08 PROCEDURE — 93298 REM INTERROG DEV EVAL SCRMS: CPT

## 2024-02-20 ENCOUNTER — RX RENEWAL (OUTPATIENT)
Age: 74
End: 2024-02-20

## 2024-02-20 RX ORDER — DILTIAZEM HYDROCHLORIDE 180 MG/1
180 CAPSULE, EXTENDED RELEASE ORAL
Qty: 90 | Refills: 3 | Status: ACTIVE | COMMUNITY
Start: 2024-02-20 | End: 1900-01-01

## 2024-03-11 ENCOUNTER — NON-APPOINTMENT (OUTPATIENT)
Age: 74
End: 2024-03-11

## 2024-03-12 ENCOUNTER — APPOINTMENT (OUTPATIENT)
Dept: CARDIOLOGY | Facility: CLINIC | Age: 74
End: 2024-03-12
Payer: MEDICARE

## 2024-03-12 PROCEDURE — 93298 REM INTERROG DEV EVAL SCRMS: CPT

## 2024-03-18 NOTE — CARDIOLOGY SUMMARY
[de-identified] : SR PAST MEDICAL HISTORY:  DM (diabetes mellitus)     HLD (hyperlipidemia)     HTN (hypertension)

## 2024-04-11 ENCOUNTER — NON-APPOINTMENT (OUTPATIENT)
Age: 74
End: 2024-04-11

## 2024-04-12 ENCOUNTER — APPOINTMENT (OUTPATIENT)
Dept: CARDIOLOGY | Facility: CLINIC | Age: 74
End: 2024-04-12
Payer: MEDICARE

## 2024-04-12 PROCEDURE — 93298 REM INTERROG DEV EVAL SCRMS: CPT

## 2024-05-12 ENCOUNTER — NON-APPOINTMENT (OUTPATIENT)
Age: 74
End: 2024-05-12

## 2024-05-13 ENCOUNTER — APPOINTMENT (OUTPATIENT)
Dept: CARDIOLOGY | Facility: CLINIC | Age: 74
End: 2024-05-13
Payer: MEDICARE

## 2024-05-13 PROCEDURE — 93298 REM INTERROG DEV EVAL SCRMS: CPT

## 2024-06-13 ENCOUNTER — NON-APPOINTMENT (OUTPATIENT)
Age: 74
End: 2024-06-13

## 2024-06-14 ENCOUNTER — APPOINTMENT (OUTPATIENT)
Dept: CARDIOLOGY | Facility: CLINIC | Age: 74
End: 2024-06-14
Payer: MEDICARE

## 2024-06-14 ENCOUNTER — OFFICE (OUTPATIENT)
Dept: URBAN - METROPOLITAN AREA CLINIC 38 | Facility: CLINIC | Age: 74
Setting detail: OPHTHALMOLOGY
End: 2024-06-14
Payer: MEDICARE

## 2024-06-14 ENCOUNTER — RX ONLY (RX ONLY)
Age: 74
End: 2024-06-14

## 2024-06-14 DIAGNOSIS — H02.825: ICD-10-CM

## 2024-06-14 DIAGNOSIS — H02.822: ICD-10-CM

## 2024-06-14 DIAGNOSIS — H02.821: ICD-10-CM

## 2024-06-14 PROCEDURE — 92285 EXTERNAL OCULAR PHOTOGRAPHY: CPT | Performed by: OPHTHALMOLOGY

## 2024-06-14 PROCEDURE — 67840 REMOVE EYELID LESION: CPT | Mod: 50 | Performed by: OPHTHALMOLOGY

## 2024-06-14 PROCEDURE — 93298 REM INTERROG DEV EVAL SCRMS: CPT

## 2024-06-14 ASSESSMENT — LID POSITION - DERMATOCHALASIS
OD_DERMATOCHALASIS: RUL T
OS_DERMATOCHALASIS: LUL T

## 2024-06-14 ASSESSMENT — CONFRONTATIONAL VISUAL FIELD TEST (CVF)
OD_FINDINGS: FULL
OS_FINDINGS: FULL

## 2024-06-24 ENCOUNTER — OFFICE (OUTPATIENT)
Dept: URBAN - METROPOLITAN AREA CLINIC 8 | Facility: CLINIC | Age: 74
Setting detail: OPHTHALMOLOGY
End: 2024-06-24
Payer: MEDICARE

## 2024-06-24 DIAGNOSIS — H40.1131: ICD-10-CM

## 2024-06-24 DIAGNOSIS — H25.13: ICD-10-CM

## 2024-06-24 PROBLEM — H02.821 LID LESION; RIGHT UPPER LID, RIGHT LOWER LID, LEFT UPPER LID, LEFT LOWER LID: Status: ACTIVE | Noted: 2024-06-14

## 2024-06-24 PROBLEM — H02.825 LID LESION; RIGHT UPPER LID, RIGHT LOWER LID, LEFT UPPER LID, LEFT LOWER LID: Status: ACTIVE | Noted: 2024-06-14

## 2024-06-24 PROBLEM — H02.824 LID LESION; RIGHT UPPER LID, RIGHT LOWER LID, LEFT UPPER LID, LEFT LOWER LID: Status: ACTIVE | Noted: 2024-06-14

## 2024-06-24 PROBLEM — H02.822 LID LESION; RIGHT UPPER LID, RIGHT LOWER LID, LEFT UPPER LID, LEFT LOWER LID: Status: ACTIVE | Noted: 2024-06-14

## 2024-06-24 PROCEDURE — 92133 CPTRZD OPH DX IMG PST SGM ON: CPT | Performed by: OPHTHALMOLOGY

## 2024-06-24 PROCEDURE — 99213 OFFICE O/P EST LOW 20 MIN: CPT | Performed by: OPHTHALMOLOGY

## 2024-06-24 ASSESSMENT — CONFRONTATIONAL VISUAL FIELD TEST (CVF)
OD_FINDINGS: FULL
OS_FINDINGS: FULL

## 2024-06-24 ASSESSMENT — LID POSITION - DERMATOCHALASIS
OD_DERMATOCHALASIS: RUL T
OS_DERMATOCHALASIS: LUL T

## 2024-06-26 ENCOUNTER — APPOINTMENT (OUTPATIENT)
Dept: CARDIOLOGY | Facility: CLINIC | Age: 74
End: 2024-06-26
Payer: MEDICARE

## 2024-06-26 VITALS
WEIGHT: 220 LBS | HEIGHT: 71 IN | OXYGEN SATURATION: 97 % | HEART RATE: 69 BPM | BODY MASS INDEX: 30.8 KG/M2 | DIASTOLIC BLOOD PRESSURE: 68 MMHG | SYSTOLIC BLOOD PRESSURE: 108 MMHG

## 2024-06-26 DIAGNOSIS — I48.0 PAROXYSMAL ATRIAL FIBRILLATION: ICD-10-CM

## 2024-06-26 PROCEDURE — 93291 INTERROG DEV EVAL SCRMS IP: CPT

## 2024-07-12 ENCOUNTER — OFFICE (OUTPATIENT)
Dept: URBAN - METROPOLITAN AREA CLINIC 38 | Facility: CLINIC | Age: 74
Setting detail: OPHTHALMOLOGY
End: 2024-07-12
Payer: MEDICARE

## 2024-07-12 DIAGNOSIS — H02.821: ICD-10-CM

## 2024-07-12 DIAGNOSIS — H02.822: ICD-10-CM

## 2024-07-12 DIAGNOSIS — H02.831: ICD-10-CM

## 2024-07-12 DIAGNOSIS — H02.825: ICD-10-CM

## 2024-07-12 DIAGNOSIS — H02.834: ICD-10-CM

## 2024-07-12 DIAGNOSIS — H02.824: ICD-10-CM

## 2024-07-12 PROCEDURE — 99213 OFFICE O/P EST LOW 20 MIN: CPT | Performed by: OPHTHALMOLOGY

## 2024-07-12 ASSESSMENT — CONFRONTATIONAL VISUAL FIELD TEST (CVF)
OD_FINDINGS: FULL
OS_FINDINGS: FULL

## 2024-07-12 ASSESSMENT — LID POSITION - DERMATOCHALASIS
OS_DERMATOCHALASIS: LUL T
OD_DERMATOCHALASIS: RUL T

## 2024-07-30 ENCOUNTER — NON-APPOINTMENT (OUTPATIENT)
Age: 74
End: 2024-07-30

## 2024-07-31 ENCOUNTER — APPOINTMENT (OUTPATIENT)
Dept: CARDIOLOGY | Facility: CLINIC | Age: 74
End: 2024-07-31
Payer: MEDICARE

## 2024-07-31 PROCEDURE — 93298 REM INTERROG DEV EVAL SCRMS: CPT

## 2024-08-20 ENCOUNTER — APPOINTMENT (OUTPATIENT)
Dept: CARDIOLOGY | Facility: CLINIC | Age: 74
End: 2024-08-20
Payer: MEDICARE

## 2024-08-20 VITALS
OXYGEN SATURATION: 97 % | WEIGHT: 215 LBS | HEART RATE: 69 BPM | RESPIRATION RATE: 14 BRPM | HEIGHT: 71 IN | BODY MASS INDEX: 30.1 KG/M2 | SYSTOLIC BLOOD PRESSURE: 142 MMHG | DIASTOLIC BLOOD PRESSURE: 82 MMHG

## 2024-08-20 DIAGNOSIS — Z79.01 LONG TERM (CURRENT) USE OF ANTICOAGULANTS: ICD-10-CM

## 2024-08-20 DIAGNOSIS — I10 ESSENTIAL (PRIMARY) HYPERTENSION: ICD-10-CM

## 2024-08-20 DIAGNOSIS — I48.0 PAROXYSMAL ATRIAL FIBRILLATION: ICD-10-CM

## 2024-08-20 DIAGNOSIS — R94.31 ABNORMAL ELECTROCARDIOGRAM [ECG] [EKG]: ICD-10-CM

## 2024-08-20 DIAGNOSIS — Z86.79 OTHER SPECIFIED POSTPROCEDURAL STATES: ICD-10-CM

## 2024-08-20 DIAGNOSIS — Z95.818 PRESENCE OF OTHER CARDIAC IMPLANTS AND GRAFTS: ICD-10-CM

## 2024-08-20 DIAGNOSIS — Z98.890 OTHER SPECIFIED POSTPROCEDURAL STATES: ICD-10-CM

## 2024-08-20 PROCEDURE — 99214 OFFICE O/P EST MOD 30 MIN: CPT

## 2024-08-20 NOTE — HISTORY OF PRESENT ILLNESS
[FreeTextEntry1] : KEV GARSIA  is a 74 year old  F Last seen February 2024.  Loop recorder checks reviewed.  February to July 24 no events seen by ACP June 24 labs August 24 creatinine 1.1 CPK 53 total cholesterol 170 LDL 82.  Recent labs and roup recorder checks reviewed  She has symptoms of leg cramping.  She undergoes intermittent epidurals.  She holds anticoagulation for epidurals.  She does feel less cramping on a statin holiday.  I discussed use of alternative statin and nonstatin lipid-lowering therapy.  On repeat examination her blood pressure is 132/76.  There is a reactive component to her blood pressure.  Clinical follow-up will be scheduled 1 year.  Instructed to notify office of any new symptoms.  History of hypertension hyperlipidemia. Prior to her A. fib ablation she had been hospitalized with shortness of breath and RVR. Prior catheter ablation of atrial fibrillation Jan 2022 RJ. Maintained on anticoagulation. There is a reported history of TIA. She remains off amiodarone. There is minor bruising on Eliquis. She does report feeling better after her atrial fibrillation procedure.  There is no exertional chest pain, pressure or discomfort. There is no orthopnea. There are no symptomatic palpitations or syncope.  Her primary physician stopped angiotensin receptor blocker.  recent outside note reviewed. She was seen for lightheadedness.  Nuclear stress test 1/2024 normal myocardial perfusion, no evidence of infarction or inducible ischemia  Carotid doppler 1/2024 mild to mod nonobx atherosclerosis BL  Abdominal ultrasound 9/2023 had no aneurysm.  Echocardiogram 9/2023 is normal left ventricular function with left atrial enlargement mild valvular heart disease.  prior MAL normal left ventricular function no clot patent foramen ovale   PAF, ablation Jan 2022 RJ + Symptomatic, hospitalized with RVR prior to ablation with no recent recurrence HTN continue toprol dilt eliquis  monitor labs Reviewed BP goals  monitor at home off losartan Low-sodium diet. ILR monitoring   HL asc no ischemia cont statin  Discussed exertional symptoms such as decreased exercise tolerance, chest discomfort, or shortness of breath which would warrant sooner evaluation/further investigation. Any questions and concerns were addressed and resolved.

## 2024-08-22 ENCOUNTER — APPOINTMENT (OUTPATIENT)
Dept: VASCULAR SURGERY | Facility: CLINIC | Age: 74
End: 2024-08-22
Payer: MEDICARE

## 2024-08-22 VITALS — OXYGEN SATURATION: 98 % | DIASTOLIC BLOOD PRESSURE: 72 MMHG | HEART RATE: 76 BPM | SYSTOLIC BLOOD PRESSURE: 138 MMHG

## 2024-08-22 DIAGNOSIS — I83.893 VARICOSE VEINS OF BILATERAL LOWER EXTREMITIES WITH OTHER COMPLICATIONS: ICD-10-CM

## 2024-08-22 PROCEDURE — 99203 OFFICE O/P NEW LOW 30 MIN: CPT

## 2024-08-22 PROCEDURE — 93970 EXTREMITY STUDY: CPT

## 2024-08-26 ENCOUNTER — APPOINTMENT (OUTPATIENT)
Dept: MRI IMAGING | Facility: CLINIC | Age: 74
End: 2024-08-26
Payer: MEDICARE

## 2024-08-26 PROCEDURE — 70551 MRI BRAIN STEM W/O DYE: CPT | Mod: MH

## 2024-08-27 PROBLEM — I83.893 VARICOSE VEINS OF BILATERAL LOWER EXTREMITIES WITH OTHER COMPLICATIONS: Status: ACTIVE | Noted: 2024-08-22

## 2024-08-27 NOTE — PROCEDURE
[FreeTextEntry1] : 8/22/24 BLE venous duplex:  1. RIGHT - No acute DVT or venous insufficiency. 2. LEFT - No acute DVT. Incompetent varicosities noted on the proximal calf measuring 3.0mm with >1.0 seconds of reflux.

## 2024-08-27 NOTE — ASSESSMENT
[FreeTextEntry1] : 75 yo female with varicose veins of legs bilaterally. Chronic venous insufficiency on duplex.   Pt counseled on results of duplex and above diagnosis. Pt counseled to continue to use compression stockings  Will refer to tactile for lymphedema massage pump  Pt counseled to walk daily and elevate legs at night Plan for LLE varithena followed by RLE varithena. Discussed the risks and benefits of the procedure with the patient. All questions answered.  A total of 30 minutes was spent with patient and coordinating care

## 2024-08-27 NOTE — HISTORY OF PRESENT ILLNESS
[FreeTextEntry1] : 73 yo female HTN, HLD, atrial fibrillation, loop recorder, presents for evaluation of BLE varicose veins. Pt has had varicose veins of many years, recently becoming more painful. She also has swelling of BLE which is worse towards the end of the day after standing. Pt has been using 20-30 mmHg compression stockings for over 3 months without significant improvement in pain or swelling. Pt denies hx of DVT. Denies recent fever or chills.

## 2024-09-04 ENCOUNTER — NON-APPOINTMENT (OUTPATIENT)
Age: 74
End: 2024-09-04

## 2024-09-05 ENCOUNTER — APPOINTMENT (OUTPATIENT)
Dept: CARDIOLOGY | Facility: CLINIC | Age: 74
End: 2024-09-05
Payer: MEDICARE

## 2024-09-05 PROCEDURE — 93298 REM INTERROG DEV EVAL SCRMS: CPT

## 2024-09-12 ENCOUNTER — APPOINTMENT (OUTPATIENT)
Dept: VASCULAR SURGERY | Facility: CLINIC | Age: 74
End: 2024-09-12
Payer: MEDICARE

## 2024-09-12 PROCEDURE — 36466Z: CUSTOM

## 2024-09-12 PROCEDURE — 36465Z: CUSTOM | Mod: LT

## 2024-09-12 NOTE — PROCEDURE
[FreeTextEntry1] : LLE varithena injection  [FreeTextEntry2] : LLE varicose veins with pain  [FreeTextEntry3] : Explained varithena procedure to patient and obtained verbal consent. All questions answered prior to procedure. 70% alcohol used to prep left leg. Two accessory varicose veins access with 22 gauge IV under ultrasound guidance. 12 cc of varithena injected into varicose veins. Varithena was tracked with ultrasound and did not enter the deep system.  Sterile gauze and ACE wraps applied to left leg. Pt tolerated procedure well.

## 2024-09-19 ENCOUNTER — APPOINTMENT (OUTPATIENT)
Dept: VASCULAR SURGERY | Facility: CLINIC | Age: 74
End: 2024-09-19
Payer: MEDICARE

## 2024-09-19 VITALS
RESPIRATION RATE: 15 BRPM | BODY MASS INDEX: 28.59 KG/M2 | SYSTOLIC BLOOD PRESSURE: 144 MMHG | DIASTOLIC BLOOD PRESSURE: 80 MMHG | HEART RATE: 69 BPM | OXYGEN SATURATION: 95 % | WEIGHT: 205 LBS

## 2024-09-19 PROCEDURE — 36465Z: CUSTOM

## 2024-09-19 NOTE — PROCEDURE
[FreeTextEntry1] : LLE varithena injection  [FreeTextEntry2] : LLE varicose veins with pain  [FreeTextEntry3] : Explained varithena procedure to patient and obtained verbal consent. All questions answered prior to procedure. 70% alcohol used to prep left leg. Three accessory varicose veins access with 22 gauge IV under ultrasound guidance. 5 cc of varithena injected into varicose veins. Varithena was tracked with ultrasound and did not enter the deep system.  Sterile gauze and ACE wraps applied to left leg. Pt tolerated procedure well.

## 2024-09-26 ENCOUNTER — APPOINTMENT (OUTPATIENT)
Dept: VASCULAR SURGERY | Facility: CLINIC | Age: 74
End: 2024-09-26
Payer: MEDICARE

## 2024-09-26 DIAGNOSIS — I83.893 VARICOSE VEINS OF BILATERAL LOWER EXTREMITIES WITH OTHER COMPLICATIONS: ICD-10-CM

## 2024-09-26 PROCEDURE — 99213 OFFICE O/P EST LOW 20 MIN: CPT

## 2024-09-26 PROCEDURE — 93971 EXTREMITY STUDY: CPT

## 2024-09-26 NOTE — PROCEDURE
[FreeTextEntry1] : 8/22/24 BLE venous duplex:  1. RIGHT - No acute DVT or venous insufficiency. 2. LEFT - No acute DVT. Incompetent varicosities noted on the proximal calf measuring 3.0mm with >1.0 seconds of reflux.  9/26/24 LLE venous duplex: several thrombosed varicose veins in thigh and calf. No DVT

## 2024-09-26 NOTE — ASSESSMENT
[FreeTextEntry1] : 73 yo female s/p LLE varithena injection for painful varicose veins. On post op duplex no DVT  Pt counseled on results of duplex and above diagnosis. Pt counseled to continue to use compression stockings  Will refer to tactile for lymphedema massage pump  Pt counseled to walk daily and elevate legs at night RTC in 6 weeks to monitor symptoms   A total of 30 minutes was spent with patient and coordinating care

## 2024-09-26 NOTE — HISTORY OF PRESENT ILLNESS
[FreeTextEntry1] : 8/22/24: 73 yo female HTN, HLD, atrial fibrillation, loop recorder, presents for evaluation of BLE varicose veins. Pt has had varicose veins of many years, recently becoming more painful. She also has swelling of BLE which is worse towards the end of the day after standing. Pt has been using 20-30 mmHg compression stockings for over 3 months without significant improvement in pain or swelling. Pt denies hx of DVT. Denies recent fever or chills.  9/26/24: Pt is s/p 2 sessions of LLE varithena injection. She feels the left leg veins do not bulge as much since injection. She has some tenderness in injected veins.   PSHx:  9/12/24 LLE varithena injection  9/19/24 LLE varithena injection

## 2024-10-07 ENCOUNTER — NON-APPOINTMENT (OUTPATIENT)
Age: 74
End: 2024-10-07

## 2024-10-07 ENCOUNTER — APPOINTMENT (OUTPATIENT)
Dept: CARDIOLOGY | Facility: CLINIC | Age: 74
End: 2024-10-07
Payer: MEDICARE

## 2024-10-07 PROBLEM — H40.1132 POAG; BOTH EYES MODERATE: Status: ACTIVE | Noted: 2024-10-07

## 2024-10-07 PROCEDURE — 93298 REM INTERROG DEV EVAL SCRMS: CPT

## 2024-11-07 ENCOUNTER — APPOINTMENT (OUTPATIENT)
Dept: VASCULAR SURGERY | Facility: CLINIC | Age: 74
End: 2024-11-07
Payer: MEDICARE

## 2024-11-07 ENCOUNTER — APPOINTMENT (OUTPATIENT)
Dept: CARDIOLOGY | Facility: CLINIC | Age: 74
End: 2024-11-07

## 2024-11-07 VITALS
OXYGEN SATURATION: 99 % | SYSTOLIC BLOOD PRESSURE: 124 MMHG | BODY MASS INDEX: 29.82 KG/M2 | WEIGHT: 213 LBS | HEIGHT: 71 IN | HEART RATE: 68 BPM | DIASTOLIC BLOOD PRESSURE: 64 MMHG

## 2024-11-07 DIAGNOSIS — I83.893 VARICOSE VEINS OF BILATERAL LOWER EXTREMITIES WITH OTHER COMPLICATIONS: ICD-10-CM

## 2024-11-07 PROCEDURE — 93298 REM INTERROG DEV EVAL SCRMS: CPT

## 2024-11-07 PROCEDURE — 99213 OFFICE O/P EST LOW 20 MIN: CPT

## 2024-11-11 ENCOUNTER — RX RENEWAL (OUTPATIENT)
Age: 74
End: 2024-11-11

## 2024-12-09 ENCOUNTER — OFFICE (OUTPATIENT)
Dept: URBAN - METROPOLITAN AREA CLINIC 8 | Facility: CLINIC | Age: 74
Setting detail: OPHTHALMOLOGY
End: 2024-12-09
Payer: MEDICARE

## 2024-12-09 DIAGNOSIS — H02.831: ICD-10-CM

## 2024-12-09 DIAGNOSIS — H02.834: ICD-10-CM

## 2024-12-09 DIAGNOSIS — H25.13: ICD-10-CM

## 2024-12-09 DIAGNOSIS — H40.1131: ICD-10-CM

## 2024-12-09 DIAGNOSIS — H35.363: ICD-10-CM

## 2024-12-09 PROCEDURE — 92083 EXTENDED VISUAL FIELD XM: CPT | Performed by: OPHTHALMOLOGY

## 2024-12-09 PROCEDURE — 92014 COMPRE OPH EXAM EST PT 1/>: CPT | Performed by: OPHTHALMOLOGY

## 2024-12-09 ASSESSMENT — REFRACTION_AUTOREFRACTION
OS_AXIS: 107
OS_SPHERE: +2.25
OD_AXIS: 084
OD_SPHERE: +3.00
OD_CYLINDER: -1.75
OS_CYLINDER: -0.75

## 2024-12-09 ASSESSMENT — PACHYMETRY
OS_CT_CORRECTION: 2
OD_CT_UM: 520
OD_CT_CORRECTION: 1
OS_CT_UM: 519

## 2024-12-09 ASSESSMENT — REFRACTION_CURRENTRX
OS_VPRISM_DIRECTION: SV
OS_OVR_VA: 20/
OS_ADD: +3.50
OD_VPRISM_DIRECTION: SV
OD_ADD: +3.50
OD_OVR_VA: 20/

## 2024-12-09 ASSESSMENT — KERATOMETRY
METHOD_AUTO_MANUAL: AUTO
OD_AXISANGLE_DEGREES: 135
OS_K1POWER_DIOPTERS: 43.50
OD_K2POWER_DIOPTERS: 44.00
OS_K2POWER_DIOPTERS: 44.50
OD_K1POWER_DIOPTERS: 43.75
OS_AXISANGLE_DEGREES: 071

## 2024-12-09 ASSESSMENT — REFRACTION_MANIFEST
OU_VA: 20/20
OS_CYLINDER: -1.00
OS_AXIS: 107
OS_VA2: 20/25
OS_ADD: +3.00
OD_SPHERE: +2.50
OD_VA1: 20/25-2
OD_CYLINDER: -1.25
OS_SPHERE: +2.00
OD_ADD: +3.00
OS_VA1: 20/20-
OD_VA2: 20/25
OD_AXIS: 084

## 2024-12-09 ASSESSMENT — VISUAL ACUITY
OD_BCVA: 20/60
OS_BCVA: 20/70

## 2024-12-09 ASSESSMENT — LID POSITION - DERMATOCHALASIS
OS_DERMATOCHALASIS: LUL T
OD_DERMATOCHALASIS: RUL T

## 2024-12-09 ASSESSMENT — TONOMETRY
OD_IOP_MMHG: 13
OS_IOP_MMHG: 14

## 2024-12-09 ASSESSMENT — CONFRONTATIONAL VISUAL FIELD TEST (CVF)
OS_FINDINGS: FULL
OD_FINDINGS: FULL

## 2024-12-12 ENCOUNTER — APPOINTMENT (OUTPATIENT)
Dept: CARDIOLOGY | Facility: CLINIC | Age: 74
End: 2024-12-12
Payer: MEDICARE

## 2024-12-12 ENCOUNTER — NON-APPOINTMENT (OUTPATIENT)
Age: 74
End: 2024-12-12

## 2024-12-12 PROCEDURE — 93298 REM INTERROG DEV EVAL SCRMS: CPT

## 2024-12-30 ENCOUNTER — APPOINTMENT (OUTPATIENT)
Dept: RADIOLOGY | Facility: CLINIC | Age: 74
End: 2024-12-30
Payer: MEDICARE

## 2024-12-30 PROCEDURE — 73630 X-RAY EXAM OF FOOT: CPT | Mod: LT

## 2025-01-16 ENCOUNTER — APPOINTMENT (OUTPATIENT)
Dept: CARDIOLOGY | Facility: CLINIC | Age: 75
End: 2025-01-16
Payer: MEDICARE

## 2025-01-16 ENCOUNTER — NON-APPOINTMENT (OUTPATIENT)
Age: 75
End: 2025-01-16

## 2025-01-16 PROCEDURE — 93298 REM INTERROG DEV EVAL SCRMS: CPT

## 2025-02-14 ENCOUNTER — RX RENEWAL (OUTPATIENT)
Age: 75
End: 2025-02-14

## 2025-02-20 ENCOUNTER — NON-APPOINTMENT (OUTPATIENT)
Age: 75
End: 2025-02-20

## 2025-02-20 ENCOUNTER — APPOINTMENT (OUTPATIENT)
Dept: CARDIOLOGY | Facility: CLINIC | Age: 75
End: 2025-02-20
Payer: MEDICARE

## 2025-02-20 PROCEDURE — 93298 REM INTERROG DEV EVAL SCRMS: CPT

## 2025-03-27 ENCOUNTER — APPOINTMENT (OUTPATIENT)
Dept: CARDIOLOGY | Facility: CLINIC | Age: 75
End: 2025-03-27
Payer: MEDICARE

## 2025-03-27 ENCOUNTER — NON-APPOINTMENT (OUTPATIENT)
Age: 75
End: 2025-03-27

## 2025-03-27 PROCEDURE — 93298 REM INTERROG DEV EVAL SCRMS: CPT

## 2025-05-01 ENCOUNTER — NON-APPOINTMENT (OUTPATIENT)
Age: 75
End: 2025-05-01

## 2025-05-01 ENCOUNTER — APPOINTMENT (OUTPATIENT)
Dept: CARDIOLOGY | Facility: CLINIC | Age: 75
End: 2025-05-01
Payer: MEDICARE

## 2025-05-01 PROCEDURE — 93298 REM INTERROG DEV EVAL SCRMS: CPT

## 2025-06-18 ENCOUNTER — OFFICE (OUTPATIENT)
Dept: URBAN - METROPOLITAN AREA CLINIC 8 | Facility: CLINIC | Age: 75
Setting detail: OPHTHALMOLOGY
End: 2025-06-18
Payer: MEDICARE

## 2025-06-18 DIAGNOSIS — H40.1131: ICD-10-CM

## 2025-06-18 DIAGNOSIS — H25.13: ICD-10-CM

## 2025-06-18 DIAGNOSIS — H35.363: ICD-10-CM

## 2025-06-18 DIAGNOSIS — H02.831: ICD-10-CM

## 2025-06-18 DIAGNOSIS — H02.834: ICD-10-CM

## 2025-06-18 PROBLEM — H40.1132 POAG; BOTH EYES MODERATE: Status: ACTIVE | Noted: 2025-06-18

## 2025-06-18 PROCEDURE — 92133 CPTRZD OPH DX IMG PST SGM ON: CPT | Performed by: OPHTHALMOLOGY

## 2025-06-18 PROCEDURE — 99213 OFFICE O/P EST LOW 20 MIN: CPT | Performed by: OPHTHALMOLOGY

## 2025-06-18 ASSESSMENT — REFRACTION_MANIFEST
OS_VA1: 20/20-
OS_ADD: +3.00
OD_VA2: 20/25
OD_AXIS: 084
OS_VA2: 20/25
OD_SPHERE: +2.50
OS_CYLINDER: -1.00
OS_SPHERE: +2.00
OD_ADD: +3.00
OD_CYLINDER: -1.25
OD_VA1: 20/25-2
OS_AXIS: 107
OU_VA: 20/20

## 2025-06-18 ASSESSMENT — KERATOMETRY
METHOD_AUTO_MANUAL: AUTO
OS_K2POWER_DIOPTERS: 44.25
OS_AXISANGLE_DEGREES: 076
OD_AXISANGLE_DEGREES: 098
OD_K1POWER_DIOPTERS: 43.50
OS_K1POWER_DIOPTERS: 43.75
OD_K2POWER_DIOPTERS: 43.75

## 2025-06-18 ASSESSMENT — PACHYMETRY
OS_CT_CORRECTION: 2
OD_CT_CORRECTION: 1
OS_CT_UM: 519
OD_CT_UM: 520

## 2025-06-18 ASSESSMENT — REFRACTION_AUTOREFRACTION
OD_AXIS: 082
OD_CYLINDER: -1.50
OS_AXIS: 100
OD_SPHERE: +3.00
OS_CYLINDER: -0.75
OS_SPHERE: +2.50

## 2025-06-18 ASSESSMENT — TONOMETRY
OD_IOP_MMHG: 13
OS_IOP_MMHG: 13

## 2025-06-18 ASSESSMENT — REFRACTION_CURRENTRX
OD_ADD: +3.50
OS_OVR_VA: 20/
OD_OVR_VA: 20/
OS_VPRISM_DIRECTION: SV
OD_VPRISM_DIRECTION: SV
OS_ADD: +3.50

## 2025-06-18 ASSESSMENT — LID POSITION - DERMATOCHALASIS
OD_DERMATOCHALASIS: RUL T
OS_DERMATOCHALASIS: LUL T

## 2025-06-18 ASSESSMENT — VISUAL ACUITY
OS_BCVA: 20/60+2
OD_BCVA: 20/50-2

## 2025-06-18 ASSESSMENT — CONFRONTATIONAL VISUAL FIELD TEST (CVF)
OS_FINDINGS: FULL
OD_FINDINGS: FULL

## 2025-06-19 ENCOUNTER — APPOINTMENT (OUTPATIENT)
Dept: CARDIOLOGY | Facility: CLINIC | Age: 75
End: 2025-06-19
Payer: MEDICARE

## 2025-06-19 ENCOUNTER — NON-APPOINTMENT (OUTPATIENT)
Age: 75
End: 2025-06-19

## 2025-06-19 PROCEDURE — 93298 REM INTERROG DEV EVAL SCRMS: CPT

## 2025-07-02 ENCOUNTER — APPOINTMENT (OUTPATIENT)
Dept: CARDIOLOGY | Facility: CLINIC | Age: 75
End: 2025-07-02
Payer: MEDICARE

## 2025-07-02 VITALS
DIASTOLIC BLOOD PRESSURE: 60 MMHG | SYSTOLIC BLOOD PRESSURE: 124 MMHG | WEIGHT: 219 LBS | HEIGHT: 71 IN | BODY MASS INDEX: 30.66 KG/M2 | HEART RATE: 79 BPM | OXYGEN SATURATION: 95 %

## 2025-07-02 PROCEDURE — 93291 INTERROG DEV EVAL SCRMS IP: CPT

## 2025-07-21 ENCOUNTER — APPOINTMENT (OUTPATIENT)
Dept: CARDIOLOGY | Facility: CLINIC | Age: 75
End: 2025-07-21

## 2025-08-08 ENCOUNTER — APPOINTMENT (OUTPATIENT)
Dept: ELECTROPHYSIOLOGY | Facility: CLINIC | Age: 75
End: 2025-08-08

## 2025-08-08 VITALS
WEIGHT: 215 LBS | SYSTOLIC BLOOD PRESSURE: 138 MMHG | OXYGEN SATURATION: 96 % | HEART RATE: 67 BPM | HEIGHT: 71 IN | DIASTOLIC BLOOD PRESSURE: 70 MMHG | BODY MASS INDEX: 30.1 KG/M2

## 2025-08-08 DIAGNOSIS — Z98.890 OTHER SPECIFIED POSTPROCEDURAL STATES: ICD-10-CM

## 2025-08-08 DIAGNOSIS — I48.0 PAROXYSMAL ATRIAL FIBRILLATION: ICD-10-CM

## 2025-08-08 DIAGNOSIS — Z86.79 OTHER SPECIFIED POSTPROCEDURAL STATES: ICD-10-CM

## 2025-08-08 PROCEDURE — 99203 OFFICE O/P NEW LOW 30 MIN: CPT

## 2025-08-08 PROCEDURE — 99213 OFFICE O/P EST LOW 20 MIN: CPT

## 2025-09-15 ENCOUNTER — APPOINTMENT (OUTPATIENT)
Dept: CARDIOLOGY | Facility: CLINIC | Age: 75
End: 2025-09-15
Payer: MEDICARE

## 2025-09-15 ENCOUNTER — NON-APPOINTMENT (OUTPATIENT)
Age: 75
End: 2025-09-15

## 2025-09-15 VITALS — DIASTOLIC BLOOD PRESSURE: 70 MMHG | SYSTOLIC BLOOD PRESSURE: 140 MMHG

## 2025-09-15 VITALS
WEIGHT: 219 LBS | HEART RATE: 81 BPM | OXYGEN SATURATION: 97 % | DIASTOLIC BLOOD PRESSURE: 74 MMHG | BODY MASS INDEX: 30.66 KG/M2 | HEIGHT: 71 IN | SYSTOLIC BLOOD PRESSURE: 158 MMHG

## 2025-09-15 DIAGNOSIS — I35.1 NONRHEUMATIC AORTIC (VALVE) INSUFFICIENCY: ICD-10-CM

## 2025-09-15 DIAGNOSIS — Z95.818 PRESENCE OF OTHER CARDIAC IMPLANTS AND GRAFTS: ICD-10-CM

## 2025-09-15 DIAGNOSIS — I48.0 PAROXYSMAL ATRIAL FIBRILLATION: ICD-10-CM

## 2025-09-15 DIAGNOSIS — E78.00 PURE HYPERCHOLESTEROLEMIA, UNSPECIFIED: ICD-10-CM

## 2025-09-15 DIAGNOSIS — I10 ESSENTIAL (PRIMARY) HYPERTENSION: ICD-10-CM

## 2025-09-15 DIAGNOSIS — Z86.79 OTHER SPECIFIED POSTPROCEDURAL STATES: ICD-10-CM

## 2025-09-15 DIAGNOSIS — R06.09 OTHER FORMS OF DYSPNEA: ICD-10-CM

## 2025-09-15 DIAGNOSIS — Z79.899 OTHER LONG TERM (CURRENT) DRUG THERAPY: ICD-10-CM

## 2025-09-15 DIAGNOSIS — Z98.890 OTHER SPECIFIED POSTPROCEDURAL STATES: ICD-10-CM

## 2025-09-15 PROCEDURE — G2211 COMPLEX E/M VISIT ADD ON: CPT

## 2025-09-15 PROCEDURE — 99214 OFFICE O/P EST MOD 30 MIN: CPT

## 2025-09-15 PROCEDURE — 93000 ELECTROCARDIOGRAM COMPLETE: CPT

## 2025-09-15 RX ORDER — EPINEPHRINE 0.3 MG/.3ML
0.3 INJECTION INTRAMUSCULAR
Qty: 2 | Refills: 0 | Status: ACTIVE | COMMUNITY
Start: 2025-05-02

## 2025-09-15 RX ORDER — BEMPEDOIC ACID AND EZETIMIBE 180; 10 MG/1; MG/1
180-10 TABLET, FILM COATED ORAL
Qty: 90 | Refills: 0 | Status: ACTIVE | COMMUNITY
Start: 2024-11-23

## 2025-09-15 RX ORDER — TRAZODONE HYDROCHLORIDE 50 MG/1
50 TABLET ORAL
Qty: 90 | Refills: 0 | Status: ACTIVE | COMMUNITY
Start: 2025-06-26